# Patient Record
Sex: FEMALE | Race: WHITE | URBAN - METROPOLITAN AREA
[De-identification: names, ages, dates, MRNs, and addresses within clinical notes are randomized per-mention and may not be internally consistent; named-entity substitution may affect disease eponyms.]

---

## 2017-05-16 RX ORDER — LOSARTAN POTASSIUM AND HYDROCHLOROTHIAZIDE 12.5; 5 MG/1; MG/1
1 TABLET ORAL AT BEDTIME
COMMUNITY

## 2017-05-17 ENCOUNTER — ANESTHESIA EVENT (OUTPATIENT)
Dept: SURGERY | Facility: CLINIC | Age: 72
DRG: 470 | End: 2017-05-17
Payer: MEDICARE

## 2017-05-17 RX ORDER — VANCOMYCIN HYDROCHLORIDE 1 G/200ML
1000 INJECTION, SOLUTION INTRAVENOUS SEE ADMIN INSTRUCTIONS
Status: CANCELLED | OUTPATIENT
Start: 2017-05-17

## 2017-05-17 RX ORDER — CLINDAMYCIN PHOSPHATE 900 MG/50ML
900 INJECTION, SOLUTION INTRAVENOUS SEE ADMIN INSTRUCTIONS
Status: CANCELLED | OUTPATIENT
Start: 2017-05-17

## 2017-05-17 RX ORDER — VANCOMYCIN HYDROCHLORIDE 1 G/200ML
1000 INJECTION, SOLUTION INTRAVENOUS
Status: CANCELLED | OUTPATIENT
Start: 2017-05-17

## 2017-05-17 RX ORDER — CLINDAMYCIN PHOSPHATE 900 MG/50ML
900 INJECTION, SOLUTION INTRAVENOUS
Status: CANCELLED | OUTPATIENT
Start: 2017-05-17

## 2017-05-18 ENCOUNTER — ANESTHESIA (OUTPATIENT)
Dept: SURGERY | Facility: CLINIC | Age: 72
DRG: 470 | End: 2017-05-18
Payer: MEDICARE

## 2017-05-18 ENCOUNTER — APPOINTMENT (OUTPATIENT)
Dept: GENERAL RADIOLOGY | Facility: CLINIC | Age: 72
DRG: 470 | End: 2017-05-18
Attending: ORTHOPAEDIC SURGERY
Payer: MEDICARE

## 2017-05-18 ENCOUNTER — HOSPITAL ENCOUNTER (INPATIENT)
Facility: CLINIC | Age: 72
LOS: 3 days | Discharge: HOME | DRG: 470 | End: 2017-05-21
Attending: ORTHOPAEDIC SURGERY | Admitting: ORTHOPAEDIC SURGERY
Payer: MEDICARE

## 2017-05-18 ENCOUNTER — APPOINTMENT (OUTPATIENT)
Dept: PHYSICAL THERAPY | Facility: CLINIC | Age: 72
DRG: 470 | End: 2017-05-18
Attending: ORTHOPAEDIC SURGERY
Payer: MEDICARE

## 2017-05-18 DIAGNOSIS — Z96.652 STATUS POST TOTAL LEFT KNEE REPLACEMENT: Primary | ICD-10-CM

## 2017-05-18 LAB
ABO + RH BLD: NORMAL
ABO + RH BLD: NORMAL
BLD GP AB SCN SERPL QL: NORMAL
BLOOD BANK CMNT PATIENT-IMP: NORMAL
POTASSIUM SERPL-SCNC: 3 MMOL/L (ref 3.4–5.3)
POTASSIUM SERPL-SCNC: 3.6 MMOL/L (ref 3.4–5.3)
SPECIMEN EXP DATE BLD: NORMAL

## 2017-05-18 PROCEDURE — 37000008 ZZH ANESTHESIA TECHNICAL FEE, 1ST 30 MIN: Performed by: ORTHOPAEDIC SURGERY

## 2017-05-18 PROCEDURE — 36415 COLL VENOUS BLD VENIPUNCTURE: CPT | Performed by: ANESTHESIOLOGY

## 2017-05-18 PROCEDURE — 25000132 ZZH RX MED GY IP 250 OP 250 PS 637: Mod: GY | Performed by: ORTHOPAEDIC SURGERY

## 2017-05-18 PROCEDURE — 25000125 ZZHC RX 250: Performed by: ORTHOPAEDIC SURGERY

## 2017-05-18 PROCEDURE — 0SRD0J9 REPLACEMENT OF LEFT KNEE JOINT WITH SYNTHETIC SUBSTITUTE, CEMENTED, OPEN APPROACH: ICD-10-PCS | Performed by: ORTHOPAEDIC SURGERY

## 2017-05-18 PROCEDURE — A9270 NON-COVERED ITEM OR SERVICE: HCPCS | Mod: GY | Performed by: ORTHOPAEDIC SURGERY

## 2017-05-18 PROCEDURE — 25000128 H RX IP 250 OP 636: Performed by: ORTHOPAEDIC SURGERY

## 2017-05-18 PROCEDURE — 25000125 ZZHC RX 250: Performed by: SURGERY

## 2017-05-18 PROCEDURE — 84132 ASSAY OF SERUM POTASSIUM: CPT | Performed by: ORTHOPAEDIC SURGERY

## 2017-05-18 PROCEDURE — 36000093 ZZH SURGERY LEVEL 4 1ST 30 MIN: Performed by: ORTHOPAEDIC SURGERY

## 2017-05-18 PROCEDURE — 25000128 H RX IP 250 OP 636

## 2017-05-18 PROCEDURE — 71000012 ZZH RECOVERY PHASE 1 LEVEL 1 FIRST HR: Performed by: ORTHOPAEDIC SURGERY

## 2017-05-18 PROCEDURE — 27810169 ZZH OR IMPLANT GENERAL: Performed by: ORTHOPAEDIC SURGERY

## 2017-05-18 PROCEDURE — 27110028 ZZH OR GENERAL SUPPLY NON-STERILE: Performed by: ORTHOPAEDIC SURGERY

## 2017-05-18 PROCEDURE — 86900 BLOOD TYPING SEROLOGIC ABO: CPT | Performed by: ORTHOPAEDIC SURGERY

## 2017-05-18 PROCEDURE — 97161 PT EVAL LOW COMPLEX 20 MIN: CPT | Mod: GP | Performed by: PHYSICAL THERAPIST

## 2017-05-18 PROCEDURE — 86850 RBC ANTIBODY SCREEN: CPT | Performed by: ORTHOPAEDIC SURGERY

## 2017-05-18 PROCEDURE — 25000128 H RX IP 250 OP 636: Performed by: SURGERY

## 2017-05-18 PROCEDURE — 25000125 ZZHC RX 250: Performed by: ANESTHESIOLOGY

## 2017-05-18 PROCEDURE — 27210995 ZZH RX 272: Performed by: ORTHOPAEDIC SURGERY

## 2017-05-18 PROCEDURE — 37000009 ZZH ANESTHESIA TECHNICAL FEE, EACH ADDTL 15 MIN: Performed by: ORTHOPAEDIC SURGERY

## 2017-05-18 PROCEDURE — 40000986 XR KNEE PORT LT 1/2 VW: Mod: LT

## 2017-05-18 PROCEDURE — 12000007 ZZH R&B INTERMEDIATE

## 2017-05-18 PROCEDURE — 25000125 ZZHC RX 250: Performed by: NURSE ANESTHETIST, CERTIFIED REGISTERED

## 2017-05-18 PROCEDURE — 25800025 ZZH RX 258: Performed by: ORTHOPAEDIC SURGERY

## 2017-05-18 PROCEDURE — 25000128 H RX IP 250 OP 636: Performed by: ANESTHESIOLOGY

## 2017-05-18 PROCEDURE — 40000193 ZZH STATISTIC PT WARD VISIT: Performed by: PHYSICAL THERAPIST

## 2017-05-18 PROCEDURE — 27210794 ZZH OR GENERAL SUPPLY STERILE: Performed by: ORTHOPAEDIC SURGERY

## 2017-05-18 PROCEDURE — 97530 THERAPEUTIC ACTIVITIES: CPT | Mod: GP | Performed by: PHYSICAL THERAPIST

## 2017-05-18 PROCEDURE — 36415 COLL VENOUS BLD VENIPUNCTURE: CPT | Performed by: ORTHOPAEDIC SURGERY

## 2017-05-18 PROCEDURE — 40000171 ZZH STATISTIC PRE-PROCEDURE ASSESSMENT III: Performed by: ORTHOPAEDIC SURGERY

## 2017-05-18 PROCEDURE — 25000128 H RX IP 250 OP 636: Performed by: NURSE ANESTHETIST, CERTIFIED REGISTERED

## 2017-05-18 PROCEDURE — 86901 BLOOD TYPING SEROLOGIC RH(D): CPT | Performed by: ORTHOPAEDIC SURGERY

## 2017-05-18 PROCEDURE — 84132 ASSAY OF SERUM POTASSIUM: CPT | Performed by: ANESTHESIOLOGY

## 2017-05-18 PROCEDURE — 97110 THERAPEUTIC EXERCISES: CPT | Mod: GP | Performed by: PHYSICAL THERAPIST

## 2017-05-18 PROCEDURE — 97116 GAIT TRAINING THERAPY: CPT | Mod: GP | Performed by: PHYSICAL THERAPIST

## 2017-05-18 PROCEDURE — 36000063 ZZH SURGERY LEVEL 4 EA 15 ADDTL MIN: Performed by: ORTHOPAEDIC SURGERY

## 2017-05-18 PROCEDURE — 82565 ASSAY OF CREATININE: CPT | Performed by: ORTHOPAEDIC SURGERY

## 2017-05-18 PROCEDURE — C1776 JOINT DEVICE (IMPLANTABLE): HCPCS | Performed by: ORTHOPAEDIC SURGERY

## 2017-05-18 DEVICE — IMP COMP TIBIAL KNEE ASCENT 71MM 141223: Type: IMPLANTABLE DEVICE | Site: KNEE | Status: FUNCTIONAL

## 2017-05-18 DEVICE — BONE CEMENT RADIOPAQUE SIMPLEX HV FULL DOSE 6194-1-001: Type: IMPLANTABLE DEVICE | Site: KNEE | Status: FUNCTIONAL

## 2017-05-18 DEVICE — IMP INSERT BASEPLATE TIBIAL BIOM 14X71/75 183644: Type: IMPLANTABLE DEVICE | Site: KNEE | Status: FUNCTIONAL

## 2017-05-18 DEVICE — IMP COMP FEMORAL VANGARD BIOM PS LT 65MM 183128: Type: IMPLANTABLE DEVICE | Site: KNEE | Status: FUNCTIONAL

## 2017-05-18 DEVICE — IMP COMP PATELLA BIOM 3 PEG 34MM STD 184766: Type: IMPLANTABLE DEVICE | Site: KNEE | Status: FUNCTIONAL

## 2017-05-18 RX ORDER — CEFAZOLIN SODIUM 2 G/100ML
2 INJECTION, SOLUTION INTRAVENOUS EVERY 8 HOURS
Status: COMPLETED | OUTPATIENT
Start: 2017-05-18 | End: 2017-05-19

## 2017-05-18 RX ORDER — ONDANSETRON 4 MG/1
4 TABLET, ORALLY DISINTEGRATING ORAL EVERY 6 HOURS PRN
Status: DISCONTINUED | OUTPATIENT
Start: 2017-05-18 | End: 2017-05-21 | Stop reason: HOSPADM

## 2017-05-18 RX ORDER — ATORVASTATIN CALCIUM 20 MG/1
80 TABLET, FILM COATED ORAL AT BEDTIME
Status: DISCONTINUED | OUTPATIENT
Start: 2017-05-18 | End: 2017-05-21 | Stop reason: HOSPADM

## 2017-05-18 RX ORDER — LIDOCAINE 40 MG/G
CREAM TOPICAL
Status: DISCONTINUED | OUTPATIENT
Start: 2017-05-18 | End: 2017-05-21 | Stop reason: HOSPADM

## 2017-05-18 RX ORDER — HYDROMORPHONE HYDROCHLORIDE 1 MG/ML
INJECTION, SOLUTION INTRAMUSCULAR; INTRAVENOUS; SUBCUTANEOUS
Status: COMPLETED
Start: 2017-05-18 | End: 2017-05-18

## 2017-05-18 RX ORDER — HYDROMORPHONE HYDROCHLORIDE 1 MG/ML
.3-.5 INJECTION, SOLUTION INTRAMUSCULAR; INTRAVENOUS; SUBCUTANEOUS EVERY 30 MIN PRN
Status: DISCONTINUED | OUTPATIENT
Start: 2017-05-18 | End: 2017-05-21 | Stop reason: HOSPADM

## 2017-05-18 RX ORDER — LIDOCAINE HYDROCHLORIDE 20 MG/ML
INJECTION, SOLUTION INFILTRATION; PERINEURAL PRN
Status: DISCONTINUED | OUTPATIENT
Start: 2017-05-18 | End: 2017-05-18

## 2017-05-18 RX ORDER — ANASTROZOLE 1 MG/1
1 TABLET ORAL DAILY
Status: DISCONTINUED | OUTPATIENT
Start: 2017-05-18 | End: 2017-05-21 | Stop reason: HOSPADM

## 2017-05-18 RX ORDER — CEFAZOLIN SODIUM 1 G/3ML
1 INJECTION, POWDER, FOR SOLUTION INTRAMUSCULAR; INTRAVENOUS SEE ADMIN INSTRUCTIONS
Status: DISCONTINUED | OUTPATIENT
Start: 2017-05-18 | End: 2017-05-18 | Stop reason: HOSPADM

## 2017-05-18 RX ORDER — LOSARTAN POTASSIUM AND HYDROCHLOROTHIAZIDE 12.5; 5 MG/1; MG/1
1 TABLET ORAL AT BEDTIME
Status: DISCONTINUED | OUTPATIENT
Start: 2017-05-18 | End: 2017-05-21 | Stop reason: HOSPADM

## 2017-05-18 RX ORDER — CEFAZOLIN SODIUM 2 G/100ML
2 INJECTION, SOLUTION INTRAVENOUS
Status: COMPLETED | OUTPATIENT
Start: 2017-05-18 | End: 2017-05-18

## 2017-05-18 RX ORDER — AMOXICILLIN 250 MG
1-2 CAPSULE ORAL 2 TIMES DAILY
Status: DISCONTINUED | OUTPATIENT
Start: 2017-05-18 | End: 2017-05-21 | Stop reason: HOSPADM

## 2017-05-18 RX ORDER — ONDANSETRON 4 MG/1
4 TABLET, ORALLY DISINTEGRATING ORAL EVERY 30 MIN PRN
Status: DISCONTINUED | OUTPATIENT
Start: 2017-05-18 | End: 2017-05-18 | Stop reason: HOSPADM

## 2017-05-18 RX ORDER — DEXTROSE MONOHYDRATE, SODIUM CHLORIDE, AND POTASSIUM CHLORIDE 50; 1.49; 4.5 G/1000ML; G/1000ML; G/1000ML
INJECTION, SOLUTION INTRAVENOUS CONTINUOUS
Status: DISCONTINUED | OUTPATIENT
Start: 2017-05-18 | End: 2017-05-21 | Stop reason: HOSPADM

## 2017-05-18 RX ORDER — CELECOXIB 200 MG/1
200 CAPSULE ORAL DAILY
Status: DISCONTINUED | OUTPATIENT
Start: 2017-05-20 | End: 2017-05-21 | Stop reason: HOSPADM

## 2017-05-18 RX ORDER — ONDANSETRON 2 MG/ML
INJECTION INTRAMUSCULAR; INTRAVENOUS PRN
Status: DISCONTINUED | OUTPATIENT
Start: 2017-05-18 | End: 2017-05-18

## 2017-05-18 RX ORDER — OXYCODONE HYDROCHLORIDE 5 MG/1
5-10 TABLET ORAL
Status: DISCONTINUED | OUTPATIENT
Start: 2017-05-18 | End: 2017-05-21 | Stop reason: HOSPADM

## 2017-05-18 RX ORDER — SODIUM CHLORIDE, SODIUM LACTATE, POTASSIUM CHLORIDE, CALCIUM CHLORIDE 600; 310; 30; 20 MG/100ML; MG/100ML; MG/100ML; MG/100ML
INJECTION, SOLUTION INTRAVENOUS CONTINUOUS
Status: DISCONTINUED | OUTPATIENT
Start: 2017-05-18 | End: 2017-05-18 | Stop reason: HOSPADM

## 2017-05-18 RX ORDER — DIPHENHYDRAMINE HYDROCHLORIDE 50 MG/ML
12.5 INJECTION INTRAMUSCULAR; INTRAVENOUS EVERY 6 HOURS PRN
Status: DISCONTINUED | OUTPATIENT
Start: 2017-05-18 | End: 2017-05-21 | Stop reason: HOSPADM

## 2017-05-18 RX ORDER — POTASSIUM CHLORIDE 1.5 G/1.58G
20-40 POWDER, FOR SOLUTION ORAL
Status: DISCONTINUED | OUTPATIENT
Start: 2017-05-18 | End: 2017-05-21 | Stop reason: HOSPADM

## 2017-05-18 RX ORDER — ALUMINA, MAGNESIA, AND SIMETHICONE 2400; 2400; 240 MG/30ML; MG/30ML; MG/30ML
15-30 SUSPENSION ORAL EVERY 4 HOURS PRN
Status: DISCONTINUED | OUTPATIENT
Start: 2017-05-18 | End: 2017-05-21 | Stop reason: HOSPADM

## 2017-05-18 RX ORDER — METOPROLOL TARTRATE 1 MG/ML
1-2 INJECTION, SOLUTION INTRAVENOUS EVERY 5 MIN PRN
Status: DISCONTINUED | OUTPATIENT
Start: 2017-05-18 | End: 2017-05-18 | Stop reason: HOSPADM

## 2017-05-18 RX ORDER — MAGNESIUM HYDROXIDE 1200 MG/15ML
LIQUID ORAL PRN
Status: DISCONTINUED | OUTPATIENT
Start: 2017-05-18 | End: 2017-05-18 | Stop reason: HOSPADM

## 2017-05-18 RX ORDER — FENTANYL CITRATE 50 UG/ML
25-50 INJECTION, SOLUTION INTRAMUSCULAR; INTRAVENOUS
Status: DISCONTINUED | OUTPATIENT
Start: 2017-05-18 | End: 2017-05-18 | Stop reason: HOSPADM

## 2017-05-18 RX ORDER — ONDANSETRON 2 MG/ML
4 INJECTION INTRAMUSCULAR; INTRAVENOUS EVERY 30 MIN PRN
Status: DISCONTINUED | OUTPATIENT
Start: 2017-05-18 | End: 2017-05-18 | Stop reason: HOSPADM

## 2017-05-18 RX ORDER — POTASSIUM CHLORIDE 1500 MG/1
20-40 TABLET, EXTENDED RELEASE ORAL
Status: DISCONTINUED | OUTPATIENT
Start: 2017-05-18 | End: 2017-05-21 | Stop reason: HOSPADM

## 2017-05-18 RX ORDER — SODIUM CHLORIDE, SODIUM LACTATE, POTASSIUM CHLORIDE, CALCIUM CHLORIDE 600; 310; 30; 20 MG/100ML; MG/100ML; MG/100ML; MG/100ML
INJECTION, SOLUTION INTRAVENOUS CONTINUOUS PRN
Status: DISCONTINUED | OUTPATIENT
Start: 2017-05-18 | End: 2017-05-18

## 2017-05-18 RX ORDER — POTASSIUM CHLORIDE 7.45 MG/ML
10 INJECTION INTRAVENOUS
Status: DISCONTINUED | OUTPATIENT
Start: 2017-05-18 | End: 2017-05-21 | Stop reason: HOSPADM

## 2017-05-18 RX ORDER — PROPOFOL 10 MG/ML
INJECTION, EMULSION INTRAVENOUS CONTINUOUS PRN
Status: DISCONTINUED | OUTPATIENT
Start: 2017-05-18 | End: 2017-05-18

## 2017-05-18 RX ORDER — MEPERIDINE HYDROCHLORIDE 25 MG/ML
12.5 INJECTION INTRAMUSCULAR; INTRAVENOUS; SUBCUTANEOUS EVERY 5 MIN PRN
Status: DISCONTINUED | OUTPATIENT
Start: 2017-05-18 | End: 2017-05-18 | Stop reason: HOSPADM

## 2017-05-18 RX ORDER — CELECOXIB 200 MG/1
200 CAPSULE ORAL 2 TIMES DAILY
Status: COMPLETED | OUTPATIENT
Start: 2017-05-18 | End: 2017-05-20

## 2017-05-18 RX ORDER — DIPHENHYDRAMINE HCL 12.5MG/5ML
12.5 LIQUID (ML) ORAL EVERY 6 HOURS PRN
Status: DISCONTINUED | OUTPATIENT
Start: 2017-05-18 | End: 2017-05-21 | Stop reason: HOSPADM

## 2017-05-18 RX ORDER — ONDANSETRON 2 MG/ML
4 INJECTION INTRAMUSCULAR; INTRAVENOUS EVERY 6 HOURS PRN
Status: DISCONTINUED | OUTPATIENT
Start: 2017-05-18 | End: 2017-05-21 | Stop reason: HOSPADM

## 2017-05-18 RX ORDER — OXYCODONE HCL 10 MG/1
10 TABLET, FILM COATED, EXTENDED RELEASE ORAL EVERY 12 HOURS
Status: DISCONTINUED | OUTPATIENT
Start: 2017-05-18 | End: 2017-05-21 | Stop reason: HOSPADM

## 2017-05-18 RX ORDER — BUPIVACAINE HYDROCHLORIDE 7.5 MG/ML
INJECTION, SOLUTION INTRASPINAL PRN
Status: DISCONTINUED | OUTPATIENT
Start: 2017-05-18 | End: 2017-05-18

## 2017-05-18 RX ORDER — POTASSIUM CL/LIDO/0.9 % NACL 10MEQ/0.1L
10 INTRAVENOUS SOLUTION, PIGGYBACK (ML) INTRAVENOUS
Status: DISCONTINUED | OUTPATIENT
Start: 2017-05-18 | End: 2017-05-21 | Stop reason: HOSPADM

## 2017-05-18 RX ORDER — NALOXONE HYDROCHLORIDE 0.4 MG/ML
.1-.4 INJECTION, SOLUTION INTRAMUSCULAR; INTRAVENOUS; SUBCUTANEOUS
Status: DISCONTINUED | OUTPATIENT
Start: 2017-05-18 | End: 2017-05-21 | Stop reason: HOSPADM

## 2017-05-18 RX ORDER — HYDROMORPHONE HYDROCHLORIDE 1 MG/ML
.3-.5 INJECTION, SOLUTION INTRAMUSCULAR; INTRAVENOUS; SUBCUTANEOUS EVERY 5 MIN PRN
Status: DISCONTINUED | OUTPATIENT
Start: 2017-05-18 | End: 2017-05-18 | Stop reason: HOSPADM

## 2017-05-18 RX ORDER — ACETAMINOPHEN 325 MG/1
650 TABLET ORAL EVERY 4 HOURS PRN
Status: DISCONTINUED | OUTPATIENT
Start: 2017-05-21 | End: 2017-05-21 | Stop reason: HOSPADM

## 2017-05-18 RX ORDER — FENTANYL CITRATE 50 UG/ML
25-50 INJECTION, SOLUTION INTRAMUSCULAR; INTRAVENOUS
Status: COMPLETED | OUTPATIENT
Start: 2017-05-18 | End: 2017-05-18

## 2017-05-18 RX ORDER — ACETAMINOPHEN 325 MG/1
975 TABLET ORAL EVERY 8 HOURS
Status: DISPENSED | OUTPATIENT
Start: 2017-05-18 | End: 2017-05-21

## 2017-05-18 RX ORDER — POTASSIUM CHLORIDE 29.8 MG/ML
20 INJECTION INTRAVENOUS
Status: DISCONTINUED | OUTPATIENT
Start: 2017-05-18 | End: 2017-05-21 | Stop reason: HOSPADM

## 2017-05-18 RX ORDER — CEFAZOLIN SODIUM 1 G/3ML
INJECTION, POWDER, FOR SOLUTION INTRAMUSCULAR; INTRAVENOUS PRN
Status: DISCONTINUED | OUTPATIENT
Start: 2017-05-18 | End: 2017-05-18

## 2017-05-18 RX ORDER — PROCHLORPERAZINE MALEATE 5 MG
5 TABLET ORAL EVERY 6 HOURS PRN
Status: DISCONTINUED | OUTPATIENT
Start: 2017-05-18 | End: 2017-05-21 | Stop reason: HOSPADM

## 2017-05-18 RX ORDER — GABAPENTIN 100 MG/1
100 CAPSULE ORAL 3 TIMES DAILY
Status: COMPLETED | OUTPATIENT
Start: 2017-05-18 | End: 2017-05-21

## 2017-05-18 RX ADMIN — OXYCODONE HYDROCHLORIDE 10 MG: 10 TABLET, FILM COATED, EXTENDED RELEASE ORAL at 17:55

## 2017-05-18 RX ADMIN — HYDROMORPHONE HYDROCHLORIDE 0.5 MG: 1 INJECTION, SOLUTION INTRAMUSCULAR; INTRAVENOUS; SUBCUTANEOUS at 16:58

## 2017-05-18 RX ADMIN — OXYCODONE HYDROCHLORIDE 5 MG: 5 TABLET ORAL at 22:30

## 2017-05-18 RX ADMIN — CELECOXIB 200 MG: 200 CAPSULE ORAL at 20:05

## 2017-05-18 RX ADMIN — LOSARTAN POTASSIUM AND HYDROCHLOROTHIAZIDE 1 TABLET: 50; 12.5 TABLET, FILM COATED ORAL at 22:32

## 2017-05-18 RX ADMIN — SODIUM CHLORIDE, POTASSIUM CHLORIDE, SODIUM LACTATE AND CALCIUM CHLORIDE: 600; 310; 30; 20 INJECTION, SOLUTION INTRAVENOUS at 11:16

## 2017-05-18 RX ADMIN — GABAPENTIN 100 MG: 100 CAPSULE ORAL at 17:55

## 2017-05-18 RX ADMIN — HYDROMORPHONE HYDROCHLORIDE 0.5 MG: 1 INJECTION, SOLUTION INTRAMUSCULAR; INTRAVENOUS; SUBCUTANEOUS at 14:30

## 2017-05-18 RX ADMIN — SODIUM CHLORIDE, POTASSIUM CHLORIDE, SODIUM LACTATE AND CALCIUM CHLORIDE: 600; 310; 30; 20 INJECTION, SOLUTION INTRAVENOUS at 09:43

## 2017-05-18 RX ADMIN — SODIUM CHLORIDE, POTASSIUM CHLORIDE, SODIUM LACTATE AND CALCIUM CHLORIDE: 600; 310; 30; 20 INJECTION, SOLUTION INTRAVENOUS at 10:24

## 2017-05-18 RX ADMIN — CEFAZOLIN SODIUM 2 G: 2 INJECTION, SOLUTION INTRAVENOUS at 10:31

## 2017-05-18 RX ADMIN — BUPIVACAINE HYDROCHLORIDE IN DEXTROSE 1.8 ML: 7.5 INJECTION, SOLUTION SUBARACHNOID at 10:34

## 2017-05-18 RX ADMIN — CEFAZOLIN 1 G: 1 INJECTION, POWDER, FOR SOLUTION INTRAMUSCULAR; INTRAVENOUS at 12:34

## 2017-05-18 RX ADMIN — LIDOCAINE HYDROCHLORIDE 1 ML: 10 INJECTION, SOLUTION EPIDURAL; INFILTRATION; INTRACAUDAL; PERINEURAL at 09:36

## 2017-05-18 RX ADMIN — OXYCODONE HYDROCHLORIDE 5 MG: 5 TABLET ORAL at 17:55

## 2017-05-18 RX ADMIN — TRANEXAMIC ACID 1 G: 100 INJECTION, SOLUTION INTRAVENOUS at 12:32

## 2017-05-18 RX ADMIN — SODIUM CHLORIDE, POTASSIUM CHLORIDE, SODIUM LACTATE AND CALCIUM CHLORIDE: 600; 310; 30; 20 INJECTION, SOLUTION INTRAVENOUS at 12:41

## 2017-05-18 RX ADMIN — ROPIVACAINE HYDROCHLORIDE 20 ML GIVEN: 5 INJECTION, SOLUTION EPIDURAL; INFILTRATION; PERINEURAL at 10:10

## 2017-05-18 RX ADMIN — MIDAZOLAM HYDROCHLORIDE 1 MG: 1 INJECTION, SOLUTION INTRAMUSCULAR; INTRAVENOUS at 10:31

## 2017-05-18 RX ADMIN — ATORVASTATIN CALCIUM 80 MG: 20 TABLET, FILM COATED ORAL at 22:29

## 2017-05-18 RX ADMIN — LIDOCAINE HYDROCHLORIDE 40 MG: 20 INJECTION, SOLUTION INFILTRATION; PERINEURAL at 10:37

## 2017-05-18 RX ADMIN — PROPOFOL 40 MCG/KG/MIN: 10 INJECTION, EMULSION INTRAVENOUS at 10:35

## 2017-05-18 RX ADMIN — FENTANYL CITRATE 50 MCG: 50 INJECTION, SOLUTION INTRAMUSCULAR; INTRAVENOUS at 10:07

## 2017-05-18 RX ADMIN — SENNOSIDES AND DOCUSATE SODIUM 2 TABLET: 8.6; 5 TABLET ORAL at 20:05

## 2017-05-18 RX ADMIN — TRANEXAMIC ACID 1 G: 100 INJECTION, SOLUTION INTRAVENOUS at 10:40

## 2017-05-18 RX ADMIN — CEFAZOLIN SODIUM 2 G: 2 INJECTION, SOLUTION INTRAVENOUS at 20:01

## 2017-05-18 RX ADMIN — ACETAMINOPHEN 975 MG: 325 TABLET, FILM COATED ORAL at 17:54

## 2017-05-18 RX ADMIN — MIDAZOLAM HYDROCHLORIDE 1 MG: 1 INJECTION, SOLUTION INTRAMUSCULAR; INTRAVENOUS at 10:07

## 2017-05-18 RX ADMIN — HYDROMORPHONE HYDROCHLORIDE 0.5 MG: 1 INJECTION, SOLUTION INTRAMUSCULAR; INTRAVENOUS; SUBCUTANEOUS at 13:43

## 2017-05-18 RX ADMIN — POTASSIUM CHLORIDE, DEXTROSE MONOHYDRATE AND SODIUM CHLORIDE: 150; 5; 450 INJECTION, SOLUTION INTRAVENOUS at 18:00

## 2017-05-18 RX ADMIN — ONDANSETRON 4 MG: 2 INJECTION INTRAMUSCULAR; INTRAVENOUS at 12:37

## 2017-05-18 RX ADMIN — MIDAZOLAM HYDROCHLORIDE 1 MG: 1 INJECTION, SOLUTION INTRAMUSCULAR; INTRAVENOUS at 10:28

## 2017-05-18 RX ADMIN — GABAPENTIN 100 MG: 100 CAPSULE ORAL at 22:31

## 2017-05-18 NOTE — PROGRESS NOTES
Admission medication history interview status for the 5/18/2017  admission is complete. See EPIC admission navigator for prior to admission medications     Medication history source reliability:Good    Medication history interview source(s):Patient    Medication history resources (including written lists, pill bottles, clinic record):Patient mailed in her medication list prior to surgery    Primary pharmacy.[facility]    Additional medication history information not noted on PTA med list :None    Time spent in this activity: 40 minutes    Prior to Admission medications    Medication Sig Last Dose Taking? Auth Provider   ANASTROZOLE PO Take 1 mg by mouth daily 5/17/2017 at AM Yes Reported, Patient   VITAMIN D, CHOLECALCIFEROL, PO Take 1,000 Units by mouth daily 5/17/2017 at AM Yes Reported, Patient   Atorvastatin Calcium (LIPITOR PO) Take 80 mg by mouth At Bedtime 5/17/2017 at 2130 Yes Reported, Patient   losartan-hydrochlorothiazide (HYZAAR) 50-12.5 MG per tablet Take 1 tablet by mouth At Bedtime 5/17/2017 at 2130 Yes Reported, Patient

## 2017-05-18 NOTE — IP AVS SNAPSHOT
MRN:[id number]                      After Visit Summary   5/18/2017    Viji Sommer    MRN: [id number]           Thank you!     Thank you for choosing [facility] for your care. Our goal is always to provide you with excellent care. Hearing back from our patients is one way we can continue to improve our services. Please take a few minutes to complete the written survey that you may receive in the mail after you visit with us. Thank you!        Patient Information     Date Of Birth          1945        Designated Caregiver       Most Recent Value    Caregiver    Will someone help with your care after discharge? yes    Name of designated caregiver Jef    Phone number of caregiver [phone]    Caregiver address [address]      About your hospital stay     You were admitted on:  May 18, 2017 You last received care in the:  [place] [address] [facility]    You were discharged on:  May 21, 2017       Who to Call     For medical emergencies, please call 911.  For non-urgent questions about your medical care, please call your primary care provider or clinic, [phone]  For questions related to your surgery, please call your surgery clinic        Attending Provider     Provider Specialty    Igor Mas MD Orthopedics       Primary Care Provider Office Phone # Fax #    Jazmin SANGEETHA Aj [phone]       [place] [address]        After Care Instructions     Activity       Your activity upon discharge: activity as tolerated            Discharge Instructions       Knee immobilizer qhs x 7 days after discharge  Maxi hose until seen in follow up. May be off at night  May shower and let water roll over the wound. Pat dry with a towel            Wound care and dressings       Instructions to care for your wound at home: daily dressing changes until wound dry then leave open to air.                 "  Follow-up Appointments     Follow-up and recommended labs and tests        F/U Dr. Mas 2 weeks post op  F/U outpatient PT asap                  Further instructions from your care team       TOTAL KNEE REPLACEMENT TAKE HOME INSTRUCTIONS  Your surgeon will answer any questions about your progress. General guidelines for your care are listed below. Your surgeon may give additional instructions for your care at home. Please follow them carefully    Activity Level  1. Physical activity may be resumed gradually according to your comfort level and your surgeon s instructions. Follow your exercise program as instructed by your therapist. Do exercises at least twice a day. you may ice your knee after exercising.  2. Complete exercises two hours before bedtime to minimize the effect pain may have on sleep.  Refer to pages 19-22 of you \"Total Knee Replacement\" booklet for details  3. Do not wear your knee immobilizer unless your doctor has specifically told you to continue it.    Good Health Practices  1. Maintain an adequate fluid intake and eat a well balanced diet.  2. Be sure to include the basic food groups, such as dairy products, meat/fish, vegetables, and fruit. Each of these foods contribute to your wound healing and increasing your strength.  3. Surgery, decreased activity and pain medication all contribute to a decrease in bowel activity that can result in constipation. It is recommended that you increase your liquid intake, add fiber to your diet, increase activity, and decrease pain medication use. If you have any problems, notify your physician.  4. Wear your anti-embolism stockings day and night until seen by your surgeon if you were issued these at discharge.  (Not all patients will have anti-embolism stockings) Remove twice a day for one hour at a time. You may hand wash and air dry your stockings.  5. Notify your dentist of your total knee surgery and call your dentist one week before a dental appointment " "for antibiotics, if your dentist will not prescribe antibiotics then call your surgeon to ask for next steps.      Incision/Dressing Care  1. Keep incision clean and dry per surgeons instructions  2. Cover incision if you are still having drainage.  3.  May Shower.    Things to Watch For  1. Check incision daily for increased redness, tenderness, swelling, or drainage along the incision line. If these occur, please notify your doctor. Also, call if you develop a fever above 101 .  2. Please notify your doctor if you experience any calf pain and/or if you have surgical pain not relieved by the pain medication prescribed by your doctor.        Revised 05/8/17    Pending Results     No orders found from 5/16/2017 to 5/19/2017.            Statement of Approval     Ordered          05/20/17 1038  I have reviewed and agree with all the recommendations and orders detailed in this document.  EFFECTIVE NOW     Approved and electronically signed by:  Igor Mas MD             Admission Information     Date & Time Provider Department Dept. Phone    5/18/2017 Igor Mas MD [place] [address] [facility] [phone]      Your Vitals Were     Blood Pressure Pulse Temperature Respirations Height Weight    109/54 (BP Location: Right arm) 65 98.3  F (36.8  C) (Oral) 16 1.702 m (5' 7\") 86.6 kg (191 lb)    Pulse Oximetry BMI (Body Mass Index)                95% 29.91 kg/m2          MyChart Information     [employer] lets you send messages to your doctor, view your test results, renew your prescriptions, schedule appointments and more. To sign up, go to [email or web address] . Click on \"Log in\" on the left side of the screen, which will take you to the Welcome page. Then click on \"Sign up Now\" on the right side of the page.     You will be asked to enter the access code listed below, as well as some personal information. Please follow the directions to create your username and password.     Your access code is: " [email or web address]  Expires: 2017 10:25 AM     Your access code will  in 90 days. If you need help or a new code, please call your [facility] clinic or [phone].        Care EveryWhere ID     This is your Care EveryWhere ID. This could be used by other organizations to access your [facility] medical records  [id number]           Review of your medicines      START taking        Dose / Directions    aspirin 325 MG tablet        Dose:  325 mg   Take 1 tablet (325 mg) by mouth 2 times daily   Quantity:  90 tablet   Refills:  0       celecoxib 200 MG capsule   Commonly known as:  celeBREX        Dose:  200 mg   Take 1 capsule (200 mg) by mouth daily   Quantity:  30 capsule   Refills:  0       melatonin 3 MG tablet        Dose:  3 mg   Take 1 tablet (3 mg) by mouth nightly as needed for sleep   Quantity:  60 tablet   Refills:  0       * oxyCODONE 10 MG 12 hr tablet   Commonly known as:  OXYCONTIN   Notes to Patient:  PT REFUSED DUE TO NAUSEA, WILL GIVE HARD COPY        Dose:  10 mg   Take 1 tablet (10 mg) by mouth every 12 hours   Quantity:  10 tablet   Refills:  0       * oxyCODONE 5 MG IR tablet   Commonly known as:  ROXICODONE        Dose:  5-10 mg   Take 1-2 tablets (5-10 mg) by mouth every 3 hours as needed for moderate to severe pain   Quantity:  60 tablet   Refills:  0       senna-docusate 8.6-50 MG per tablet   Commonly known as:  SENOKOT-S;PERICOLACE        Dose:  1-2 tablet   Take 1-2 tablets by mouth 2 times daily as needed for constipation   Quantity:  100 tablet   Refills:  0       * Notice:  This list has 2 medication(s) that are the same as other medications prescribed for you. Read the directions carefully, and ask your doctor or other care provider to review them with you.      CONTINUE these medicines which have NOT CHANGED        Dose / Directions    ANASTROZOLE PO        Dose:  1 mg   Take 1 mg by mouth daily   Refills:  0       LIPITOR PO        Dose:  80 mg   Take 80 mg by mouth At Bedtime    Refills:  0       losartan-hydrochlorothiazide 50-12.5 MG per tablet   Commonly known as:  HYZAAR        Dose:  1 tablet   Take 1 tablet by mouth At Bedtime   Refills:  0       VITAMIN D (CHOLECALCIFEROL) PO        Dose:  1000 Units   Take 1,000 Units by mouth daily   Refills:  0            Where to get your medicines      These medications were sent to [facility] [place], [place] - [address]  [address], [place] [address]     Phone:  [phone]     aspirin 325 MG tablet    celecoxib 200 MG capsule    melatonin 3 MG tablet    senna-docusate 8.6-50 MG per tablet         Some of these will need a paper prescription and others can be bought over the counter. Ask your nurse if you have questions.     Bring a paper prescription for each of these medications     oxyCODONE 10 MG 12 hr tablet    oxyCODONE 5 MG IR tablet                Protect others around you: Learn how to safely use, store and throw away your medicines at www.disposemymeds.org.             Medication List: This is a list of all your medications and when to take them. Check marks below indicate your daily home schedule. Keep this list as a reference.      Medications           Morning Afternoon Evening Bedtime As Needed    ANASTROZOLE PO   Take 1 mg by mouth daily   Last time this was given:  1 mg on 5/21/2017  9:11 AM   Next Dose Due:  5/22                                   aspirin 325 MG tablet   Take 1 tablet (325 mg) by mouth 2 times daily   Next Dose Due:  5/22                                       celecoxib 200 MG capsule   Commonly known as:  celeBREX   Take 1 capsule (200 mg) by mouth daily   Last time this was given:  200 mg on 5/21/2017  9:11 AM   Next Dose Due:  5/22                                   LIPITOR PO   Take 80 mg by mouth At Bedtime   Last time this was given:  80 mg on 5/20/2017 10:34 PM   Next Dose Due:  5/21                                   losartan-hydrochlorothiazide 50-12.5 MG per tablet    Commonly known as:  HYZAAR   Take 1 tablet by mouth At Bedtime   Last time this was given:  1 tablet on 5/19/2017  9:46 PM   Next Dose Due:  5/21                                   melatonin 3 MG tablet   Take 1 tablet (3 mg) by mouth nightly as needed for sleep                                   * oxyCODONE 10 MG 12 hr tablet   Commonly known as:  OXYCONTIN   Take 1 tablet (10 mg) by mouth every 12 hours   Last time this was given:  10 mg on 5/20/2017  5:53 PM   Notes to Patient:  PT REFUSED DUE TO NAUSEA, WILL GIVE HARD COPY                                * oxyCODONE 5 MG IR tablet   Commonly known as:  ROXICODONE   Take 1-2 tablets (5-10 mg) by mouth every 3 hours as needed for moderate to severe pain   Last time this was given:  5 mg on 5/21/2017  2:26 PM                    5 mg if needed 5:30 pm                 senna-docusate 8.6-50 MG per tablet   Commonly known as:  SENOKOT-S;PERICOLACE   Take 1-2 tablets by mouth 2 times daily as needed for constipation   Last time this was given:  1 tablet on 5/21/2017  9:11 AM   Next Dose Due:  5/21                                      VITAMIN D (CHOLECALCIFEROL) PO   Take 1,000 Units by mouth daily   Last time this was given:  1,000 Units on 5/21/2017  9:11 AM   Next Dose Due:  5/22                                   * Notice:  This list has 2 medication(s) that are the same as other medications prescribed for you. Read the directions carefully, and ask your doctor or other care provider to review them with you.

## 2017-05-18 NOTE — OP NOTE
DATE OF PROCEDURE: 05/18/2017       PREOPERATIVE DIAGNOSIS:  Left knee degenerative arthrosis.      POSTOPERATIVE DIAGNOSIS:  Left knee degenerative arthrosis.      PROCEDURE:  Left total knee arthroplasty.      ASSISTANT:  DONTAE Massey      ANESTHESIA:  Regional.      COMPLICATIONS:  None.      OPERATIVE COURSE:  Ms. Viji Sommer was brought to the operating room.  An anesthetic was administered.  She received prophylactic antibiotics.  Her left lower extremity was prepped and draped in the usual sterile fashion and timeout was called.  She will be on antibiotics for 24 hours postop.  She will be on Lovenox for DVT prophylaxis, switched over to aspirin.        Her left lower extremity was prepped and draped in the usual sterile fashion.  A timeout was called.  The limb was exsanguinated and the tourniquet inflated.  I made a sharp incision from the tibial tubercle to just superior to the superior pole of the patella.  Upon entering the joint, there were large marginal osteophytes in the medial compartment with bone-on-bone contact and eburnated bone.  Cut the patella from a 21 to a 14, prepared it for a 34 button.  I placed my intramedullary guide into the femur.  We used a 6 degree valgus bushing.  He took an additional 1 mm distal femoral resection.  Sized it for a 65, made my 5-in-1 cuts, and 65 provisional fit well.  I then placed the  guide.  Looking at it, I realized it was not the correct one.  I discussed this with the rep.  It appears to be from another set or perhaps revision set.  He had to go get another one and flash it.      During this time, I went ahead and prepared the tibia.  Set the tibial extramedullary cutting guide in the long axis of the tibia.  Used the feeler gauge to take 2 mm off the more deficient medial side.  I elected to take one more.  As I put the #1 feeler gauge in place, it did not appear to be normal.  It looked like it was taking more than 1 minute, and it  frankly would not fit in the guide.  Again, the rep had the wrong instrumentation.  I therefore went back and found the correct cutting guide, which was the Elite System, and it was a 2.  I took an additional 2.  Measured the tibial side for 71.  Cemented everything in place with a 14.  A 16 would have come into full extension, was quite tight; a 12 was too loose.  No lateral release was needed.  During the cementation process, I did copiously irrigate and then soak for a total of 3 minutes with a dilute Betadine solution.  I did eventually get the correct box cutting guide for the [employer] Accessguard PS system.  I did make the box cut prior to cementing and course.  Ended up choosing a 14 as my final spacer.  No lateral release was needed.  Placed my final spacer, irrigated with antibiotics.  brought through range of motion.  Closed over a drain.  Ethibond in the proximal extensor mechanism, 0 Vicryl distally,  2-0 in the subq, staples in the skin.  Bulky sterile dressing was applied.        She returned to the recovery room in stable condition.  All sponge and needle counts were correct at the end of the procedure.  There were no known complications.         YVETTE KIRK MD             D: 2017 13:00   T: 2017 16:11   MT: [id number]      Name:     LAURA MCNEIL   MRN:      [id number]        Account:        [id number]   :      1945           Procedure Date: 2017      Document: [id number]

## 2017-05-18 NOTE — ANESTHESIA PROCEDURE NOTES
Peripheral nerve/Neuraxial procedure note : Femoral (via adductor canal approach)  Pre-Procedure  Performed by RASHID FLORES  Location: pre-op      Pre-Anesthestic Checklist: patient identified, IV checked, site marked, risks and benefits discussed, informed consent, monitors and equipment checked, pre-op evaluation, at physician/surgeon's request and post-op pain management    Timeout  Correct Patient: Yes   Correct Procedure: Yes   Correct Site: Yes   Correct Laterality: Yes   Correct Position: Yes   Site Marked: Yes   .   Procedure Documentation    .    Procedure:    Femoral (via adductor canal approach).  Local skin infiltrated with 3 mL of 1% lidocaine.     Ultrasound used to identify targeted nerve, plexus, or vascular marker and placed a needle adjacent to it., Ultrasound was used to visualize the spread of the anesthetic in close proximity to the above stated nerve. A permanent image is entered into the patient's record.  Patient Prep;mask, sterile gloves, chlorhexidine gluconate and isopropyl alcohol, patient draped.  .  Needle: insulated (21 G. 3.5 in). .  Spinal Needle: . . Insertion Method: Single Shot.     Assessment/Narrative  Paresthesias: No.  Injection made incrementally with aspirations every 5 mL..  The placement was negative for: blood aspirated, painful injection and site bleeding.  Bolus given via needle..   Secured via.   Complications: none. Comments:  Medication: 20cc of 0.5% ropivacaine with 1:400k epinephrine  Brooks-dissection with saline, 5cc  Dose given via 5cc increments with negative aspiration

## 2017-05-18 NOTE — ANESTHESIA CARE TRANSFER NOTE
Patient: Viji Sommer    Procedure(s):  LEFT TOTAL KNEE ARTHROPLASTY - Wound Class: I-Clean    Diagnosis: LEFT KNEE DJD  Diagnosis Additional Information: No value filed.    Anesthesia Type:   Spinal, Periph. Nerve Block for postop pain     Note:  Airway :Room Air  Patient transferred to:PACU  Comments: Transferred to PACU, spontaneous respirations. RA to PACU, connected to wall O2 in PACU. All monitors and alarms on and functioning, clinically stable vital signs. Report given to PACU RN and questions answered.       Vitals: (Last set prior to Anesthesia Care Transfer)    CRNA VITALS  5/18/2017 1223 - 5/18/2017 1259      5/18/2017             Resp Rate (set): 10                Electronically Signed By: LIBERTY Merritt CRNA  May 18, 2017  12:59 PM

## 2017-05-18 NOTE — BRIEF OP NOTE
[facility] Brief Operative Note    Pre-operative diagnosis: LEFT KNEE DJD   Post-operative diagnosis * No post-op diagnosis entered *  same   Procedure: Procedure(s):  LEFT TOTAL KNEE ARTHROPLASTY - Wound Class: I-Clean   Surgeon(s): Surgeon(s) and Role:     * Igor Mas MD - Primary     * Eleni James PA-C - Assisting   Estimated blood loss: 25 mL    Specimens: * No specimens in log *   Findings: Medial oa

## 2017-05-18 NOTE — ANESTHESIA PREPROCEDURE EVALUATION
Procedure: Procedure(s):  ARTHROPLASTY KNEE  Preop diagnosis: LEFT KNEE DJD    Allergies   Allergen Reactions     Lisinopril Cough     Past Medical History:   Diagnosis Date     Arthritis     knees     DCIS (ductal carcinoma in situ)      Hyperlipidemia      Hypertension      Obesity, unspecified      Past Surgical History:   Procedure Laterality Date     BREAST SURGERY      right lumpectomy 2016     EYE SURGERY      Lasik  ,  right eye scar tissue removed 2016     GYN SURGERY      tubal ligation     ORTHOPEDIC SURGERY      right knee arthroscopy 2005, left knee arthroscopic 2014     Prior to Admission medications    Medication Sig Start Date End Date Taking? Authorizing Provider   ANASTROZOLE PO Take 1 mg by mouth daily   Yes Reported, Patient   VITAMIN D, CHOLECALCIFEROL, PO Take 1,000 Units by mouth daily   Yes Reported, Patient   Atorvastatin Calcium (LIPITOR PO) Take 80 mg by mouth At Bedtime   Yes Reported, Patient   losartan-hydrochlorothiazide (HYZAAR) 50-12.5 MG per tablet Take 1 tablet by mouth At Bedtime   Yes Reported, Patient     Current Facility-Administered Medications Ordered in Epic   Medication Dose Route Frequency Last Rate Last Dose     ceFAZolin sodium-dextrose (ANCEF) infusion 2 g  2 g Intravenous Pre-Op/Pre-procedure x 1 dose         ceFAZolin (ANCEF) 1 g vial to attach to  ml bag for ADULT or 50 ml bag for PEDS  1 g Intravenous See Admin Instructions         tranexamic acid (CYKLOKAPRON) 1 g in NaCl 0.9 % 60 mL bolus  1 g Intravenous Once         tranexamic acid (CYKLOKAPRON) 1 g in NaCl 0.9 % 60 mL bolus  1 g Intravenous Once         lidocaine 1 % 1 mL  1 mL Other Q1H PRN         sodium chloride (PF) 0.9% PF flush 3 mL  3 mL Intracatheter Q1H PRN         lactated ringers infusion   Intravenous Continuous         No current [facility]-ordered outpatient prescriptions on file.     Wt Readings from Last 1 Encounters:   05/18/17 86.6 kg (191 lb)     Temp Readings from Last 1 Encounters:    05/18/17 36.4  C (97.5  F) (Temporal)     BP Readings from Last 6 Encounters:   05/18/17 129/73     Pulse Readings from Last 4 Encounters:   05/18/17 54     Resp Readings from Last 1 Encounters:   05/18/17 16     SpO2 Readings from Last 1 Encounters:   05/18/17 99%     No results for input(s): NA, POTASSIUM, CHLORIDE, CO2, ANIONGAP, GLC, BUN, CR, JESSICA in the last 20275 hours.  No results for input(s): WBC, HGB, PLT in the last 43688 hours.  No results for input(s): INR in the last 81122 hours.    Invalid input(s): APTT   RECENT LABS:   ECG:   ECHO:   CXR:      Anesthesia Evaluation     .             ROS/MED HX    ENT/Pulmonary:  - neg pulmonary ROS     Neurologic:  - neg neurologic ROS     Cardiovascular:     (+) Dyslipidemia, hypertension----. : . . . :. .       METS/Exercise Tolerance:  >4 METS   Hematologic:         Musculoskeletal:   (+) arthritis, , , other musculoskeletal- previous knee arthroscopy bilaterally      GI/Hepatic:  - neg GI/hepatic ROS       Renal/Genitourinary:  - ROS Renal section negative       Endo:     (+) Obesity, .      Psychiatric:  - neg psychiatric ROS       Infectious Disease:         Malignancy:   (+) Malignancy History of Breast  Breast CA status post Surgery.         Other:                     Physical Exam  Normal systems: dental    Airway   Mallampati: II  TM distance: >3 FB  Neck ROM: full    Dental     Cardiovascular   Rhythm and rate: regular and normal      Pulmonary    breath sounds clear to auscultation                    Anesthesia Plan      History & Physical Review  History and physical reviewed and following examination; no interval change.    ASA Status:  2 .        Plan for Spinal and Periph. Nerve Block for postop pain   PONV prophylaxis:  Ondansetron (or other 5HT-3) and Dexamethasone or Solumedrol       Postoperative Care  Postoperative pain management:  IV analgesics, Peripheral nerve block (Single Shot) and Multi-modal analgesia.      Consents  Anesthetic plan,  risks, benefits and alternatives discussed with:  Patient..                          .

## 2017-05-18 NOTE — IP AVS SNAPSHOT
[address] [facility]    [address] [place] [address]    Phone:  [phone]                                       After Visit Summary   5/18/2017    Viji Sommer    MRN: [id number]           After Visit Summary Signature Page     I have received my discharge instructions, and my questions have been answered. I have discussed any challenges I see with this plan with the nurse or doctor.    ..........................................................................................................................................  Patient/Patient Representative Signature      ..........................................................................................................................................  Patient Representative Print Name and Relationship to Patient    ..................................................               ................................................  Date                                            Time    ..........................................................................................................................................  Reviewed by Signature/Title    ...................................................              ..............................................  Date                                                            Time

## 2017-05-18 NOTE — ANESTHESIA POSTPROCEDURE EVALUATION
Patient: Viji Sommer    Procedure(s):  LEFT TOTAL KNEE ARTHROPLASTY - Wound Class: I-Clean    Diagnosis:LEFT KNEE DJD  Diagnosis Additional Information: No value filed.    Anesthesia Type:  Spinal, Periph. Nerve Block for postop pain    Note:  Anesthesia Post Evaluation    Patient location during evaluation: PACU  Patient participation: Able to fully participate in evaluation  Level of consciousness: awake and alert  Pain management: adequate  Airway patency: patent  Cardiovascular status: acceptable and hemodynamically stable  Respiratory status: acceptable and unassisted  Hydration status: acceptable  PONV: none     Anesthetic complications: None          Last vitals:  Vitals:    05/18/17 1410 05/18/17 1425 05/18/17 1455   BP:  144/71 148/70   Pulse:      Resp:  16 16   Temp:  36.4  C (97.6  F)    SpO2: 100% 93% 96%         Electronically Signed By: Daniel Regan MD  May 18, 2017  3:16 PM

## 2017-05-18 NOTE — PROGRESS NOTES
05/18/17 1600   Quick Adds   Type of Visit Initial PT Evaluation   Living Environment   Lives With alone   Living Arrangements house   Home Accessibility bed and bath on same level   Number of Stairs to Enter Home 1   Number of Stairs Within Home 12  (stairs into basement, does not need to negotiate)   Stair Railings at Home inside, present on left side;inside, present on right side  (none present outside)   Transportation Available family or friend will provide   Living Environment Comment Pt's son will stay overnight with pt first  night discharge home, neighbor who is a nurse will check in on pt mornings and evenings.    Self-Care   Dominant Hand right   Usual Activity Tolerance good   Current Activity Tolerance moderate   Regular Exercise no   Equipment Currently Used at Home none   Functional Level Prior   Ambulation 0-->independent   Transferring 0-->independent   Toileting 0-->independent   Bathing 0-->independent   Dressing 0-->independent   Eating 0-->independent   Communication 0-->understands/communicates without difficulty   Swallowing 0-->swallows foods/liquids without difficulty   Cognition 0 - no cognition issues reported   Fall history within last six months no   Which of the above functional risks had a recent onset or change? ambulation;transferring;fall history   Prior Functional Level Comment Pt reports independence with iADLs, ADLs, and functional mobility without the use of AD.    General Information   Onset of Illness/Injury or Date of Surgery - Date 05/18/17   Referring Physician Igor Mas MD   Patient/Family Goals Statement Discharge to home   Pertinent History of Current Problem (include personal factors and/or comorbidities that impact the POC) Pt is a 71 year old female with L TKA POD#0.  PMH includes moderate HTN, hyperlipidemia, obesity, and DICU.     Precautions/Limitations fall precautions   Weight-Bearing Status - LLE weight-bearing as tolerated   General Observations Pt  laying in bed upon arrival of therapist.    General Info Comments Ambulate with Assist. KI on at night.    Cognitive Status Examination   Orientation orientation to person, place and time   Level of Consciousness alert   Follows Commands and Answers Questions 100% of the time   Personal Safety and Judgment intact   Pain Assessment   Patient Currently in Pain Yes, see Vital Sign flowsheet   Integumentary/Edema   Integumentary/Edema Comments Incision covered with dressing, Hemovac present   Posture    Posture Not impaired   Range of Motion (ROM)   ROM Comment L knee ROM: 25-55,  limited by pain, otherwise bilateral LEs WFL   Strength   Strength Comments L LE strength limited by pain, R LE WFL assessed during functional mobility   Bed Mobility   Bed Mobility Comments Pt transferred sup<>sit at Min A at L LE.     Transfer Skills   Transfer Comments Pt transferred sit<>stand with FWW at [facility], pt demonstrates downward gaze and decreased trunk extension upon standing.    Gait   Gait Comments Pt ambulated 2 feet with FWW at CGA, pt ambulating with step to gait and downward gaze. Noted no L knee buckling with KI donned.    Balance   Balance Comments Seated balance good, pt able to sit EOB with no UE support with no LOB.  Standing balance is good, assessed during ambulation.   Sensory Examination   Sensory Perception other (describe)   Sensory Perception Comments numbness bilateral LEs   Coordination   Coordination Comments Good, pt able to adust gown, call light, and 02 sensor throughout session   Modality Interventions   Planned Modality Interventions Cryotherapy   Planned Modality Interventions Comments PRN.    General Therapy Interventions   Planned Therapy Interventions bed mobility training;gait training;ROM;strengthening;transfer training;progressive activity/exercise   Clinical Impression   Criteria for Skilled Therapeutic Intervention yes, treatment indicated   PT Diagnosis Impaired gait   Influenced by the following  "impairments Decreased strength L LE, pain L LE, decreased ROM L LE, fatigue   Functional limitations due to impairments Decreased functional independence   Clinical Presentation Stable/Uncomplicated   Clinical Presentation Rationale Due to PMH, current presentation, and social support   Clinical Decision Making (Complexity) Low complexity   Therapy Frequency` 2 times/day   Predicted Duration of Therapy Intervention (days/wks) 4   Anticipated Discharge Disposition Home with Outpatient Therapy   Risk & Benefits of therapy have been explained Yes   Patient, Family & other staff in agreement with plan of care Yes   [facility] AM-PAC  \"6 Clicks\" V.2 Basic Mobility Inpatient Short Form   1. Turning from your back to your side while in a flat bed without using bedrails? 4 - None   2. Moving from lying on your back to sitting on the side of a flat bed without using bedrails? 3 - A Little   3. Moving to and from a bed to a chair (including a wheelchair)? 3 - A Little   4. Standing up from a chair using your arms (e.g., wheelchair, or bedside chair)? 3 - A Little   5. To walk in hospital room? 3 - A Little   6. Climbing 3-5 steps with a railing? 2 - A Lot   Basic Mobility Raw Score (Score out of 24.Lower scores equate to lower levels of function) 18   Total Evaluation Time   Total Evaluation Time (Minutes) 10     "

## 2017-05-18 NOTE — ANESTHESIA PROCEDURE NOTES
Peripheral nerve/Neuraxial procedure note : intrathecal  Pre-Procedure  Performed by RASHID FLORES  Location: OR      Pre-Anesthestic Checklist: patient identified, IV checked, risks and benefits discussed, informed consent, monitors and equipment checked, pre-op evaluation and at physician/surgeon's request    Timeout  Correct Patient: Yes   Correct Procedure: Yes   Correct Site: Yes   Correct Laterality: N/A   Correct Position: Yes   Site Marked: N/A   .   Procedure Documentation    .    Procedure:    Intrathecal.  Insertion Site:L3-4  (midline approach)      Patient Prep;mask, sterile gloves, povidone-iodine 7.5% surgical scrub, patient draped.  .  Needle: (). # of attempts: 1. # of redirects:: 0.. Spinal Needle: [place] 24 G. 3.5 in.  Introducer used. Introducer: 20 G. .     Assessment/Narrative  Paresthesias: No.  .  .  clear CSF fluid removed . Comments:  Injected 13.5mg Bupivacaine 0.75% + dextrose  Patient tolerated the procedure well and without complications.  Patient laid flat immediately.

## 2017-05-19 ENCOUNTER — APPOINTMENT (OUTPATIENT)
Dept: PHYSICAL THERAPY | Facility: CLINIC | Age: 72
DRG: 470 | End: 2017-05-19
Attending: ORTHOPAEDIC SURGERY
Payer: MEDICARE

## 2017-05-19 LAB
CREAT SERPL-MCNC: 0.75 MG/DL (ref 0.52–1.04)
GFR SERPL CREATININE-BSD FRML MDRD: 76 ML/MIN/1.7M2
GLUCOSE SERPL-MCNC: 144 MG/DL (ref 70–99)
HGB BLD-MCNC: 10.6 G/DL (ref 11.7–15.7)
PLATELET # BLD AUTO: 173 10E9/L (ref 150–450)

## 2017-05-19 PROCEDURE — 85049 AUTOMATED PLATELET COUNT: CPT | Performed by: ORTHOPAEDIC SURGERY

## 2017-05-19 PROCEDURE — 97110 THERAPEUTIC EXERCISES: CPT | Mod: GP | Performed by: PHYSICAL THERAPIST

## 2017-05-19 PROCEDURE — 36415 COLL VENOUS BLD VENIPUNCTURE: CPT | Performed by: ORTHOPAEDIC SURGERY

## 2017-05-19 PROCEDURE — A9270 NON-COVERED ITEM OR SERVICE: HCPCS | Mod: GY | Performed by: ORTHOPAEDIC SURGERY

## 2017-05-19 PROCEDURE — 40000193 ZZH STATISTIC PT WARD VISIT: Performed by: PHYSICAL THERAPIST

## 2017-05-19 PROCEDURE — 82947 ASSAY GLUCOSE BLOOD QUANT: CPT | Performed by: ORTHOPAEDIC SURGERY

## 2017-05-19 PROCEDURE — 25800025 ZZH RX 258: Performed by: ORTHOPAEDIC SURGERY

## 2017-05-19 PROCEDURE — 25000128 H RX IP 250 OP 636: Performed by: ORTHOPAEDIC SURGERY

## 2017-05-19 PROCEDURE — 12000007 ZZH R&B INTERMEDIATE

## 2017-05-19 PROCEDURE — 25000132 ZZH RX MED GY IP 250 OP 250 PS 637: Mod: GY | Performed by: ORTHOPAEDIC SURGERY

## 2017-05-19 PROCEDURE — 85018 HEMOGLOBIN: CPT | Performed by: ORTHOPAEDIC SURGERY

## 2017-05-19 PROCEDURE — 82565 ASSAY OF CREATININE: CPT | Performed by: ORTHOPAEDIC SURGERY

## 2017-05-19 PROCEDURE — 97530 THERAPEUTIC ACTIVITIES: CPT | Mod: GP | Performed by: PHYSICAL THERAPIST

## 2017-05-19 PROCEDURE — 97116 GAIT TRAINING THERAPY: CPT | Mod: GP | Performed by: PHYSICAL THERAPIST

## 2017-05-19 RX ADMIN — ANASTROZOLE 1 MG: 1 TABLET, COATED ORAL at 08:05

## 2017-05-19 RX ADMIN — OXYCODONE HYDROCHLORIDE 5 MG: 5 TABLET ORAL at 04:45

## 2017-05-19 RX ADMIN — Medication 2.5 MG: at 18:32

## 2017-05-19 RX ADMIN — GABAPENTIN 100 MG: 100 CAPSULE ORAL at 17:04

## 2017-05-19 RX ADMIN — OXYCODONE HYDROCHLORIDE 10 MG: 10 TABLET, FILM COATED, EXTENDED RELEASE ORAL at 06:48

## 2017-05-19 RX ADMIN — Medication 2.5 MG: at 01:03

## 2017-05-19 RX ADMIN — GABAPENTIN 100 MG: 100 CAPSULE ORAL at 21:46

## 2017-05-19 RX ADMIN — OXYCODONE HYDROCHLORIDE 10 MG: 5 TABLET ORAL at 01:37

## 2017-05-19 RX ADMIN — ACETAMINOPHEN 975 MG: 325 TABLET, FILM COATED ORAL at 18:33

## 2017-05-19 RX ADMIN — ENOXAPARIN SODIUM 40 MG: 40 INJECTION SUBCUTANEOUS at 11:05

## 2017-05-19 RX ADMIN — OXYCODONE HYDROCHLORIDE 5 MG: 5 TABLET ORAL at 21:46

## 2017-05-19 RX ADMIN — OXYCODONE HYDROCHLORIDE 10 MG: 5 TABLET ORAL at 08:35

## 2017-05-19 RX ADMIN — CELECOXIB 200 MG: 200 CAPSULE ORAL at 21:46

## 2017-05-19 RX ADMIN — POTASSIUM CHLORIDE, DEXTROSE MONOHYDRATE AND SODIUM CHLORIDE: 150; 5; 450 INJECTION, SOLUTION INTRAVENOUS at 08:06

## 2017-05-19 RX ADMIN — LOSARTAN POTASSIUM AND HYDROCHLOROTHIAZIDE 1 TABLET: 50; 12.5 TABLET, FILM COATED ORAL at 21:46

## 2017-05-19 RX ADMIN — CEFAZOLIN SODIUM 2 G: 2 INJECTION, SOLUTION INTRAVENOUS at 04:46

## 2017-05-19 RX ADMIN — SENNOSIDES AND DOCUSATE SODIUM 2 TABLET: 8.6; 5 TABLET ORAL at 21:46

## 2017-05-19 RX ADMIN — CELECOXIB 200 MG: 200 CAPSULE ORAL at 08:05

## 2017-05-19 RX ADMIN — OXYCODONE HYDROCHLORIDE 10 MG: 5 TABLET ORAL at 13:03

## 2017-05-19 RX ADMIN — SENNOSIDES AND DOCUSATE SODIUM 1 TABLET: 8.6; 5 TABLET ORAL at 08:05

## 2017-05-19 RX ADMIN — VITAMIN D, TAB 1000IU (100/BT) 1000 UNITS: 25 TAB at 08:05

## 2017-05-19 RX ADMIN — ACETAMINOPHEN 975 MG: 325 TABLET, FILM COATED ORAL at 01:37

## 2017-05-19 RX ADMIN — OXYCODONE HYDROCHLORIDE 10 MG: 10 TABLET, FILM COATED, EXTENDED RELEASE ORAL at 18:32

## 2017-05-19 RX ADMIN — ATORVASTATIN CALCIUM 80 MG: 20 TABLET, FILM COATED ORAL at 21:45

## 2017-05-19 RX ADMIN — GABAPENTIN 100 MG: 100 CAPSULE ORAL at 08:05

## 2017-05-19 RX ADMIN — ONDANSETRON 4 MG: 2 SOLUTION INTRAMUSCULAR; INTRAVENOUS at 08:10

## 2017-05-19 RX ADMIN — ACETAMINOPHEN 975 MG: 325 TABLET, FILM COATED ORAL at 11:05

## 2017-05-19 NOTE — PLAN OF CARE
Problem: Knee Replacement, Total (Adult)  Goal: Signs and Symptoms of Listed Potential Problems Will be Absent or Manageable (Knee Replacement, Total)  Signs and symptoms of listed potential problems will be absent or manageable by discharge/transition of care (reference Knee Replacement, Total (Adult) CPG).   Outcome: No Change   CMS good to ft.  Osorio and HVC intact.  Knee drsg dry and intact.  Rating pain  #5--given Oxycodone, Oxycontin, Tylenol with supper.  Up out of bed with PT and took some steps.

## 2017-05-19 NOTE — PLAN OF CARE
Problem: Goal Outcome Summary  Goal: Goal Outcome Summary  Surgeon Discharge Plan: none noted, per patient, discharge to home     Current Functional Status: Pt transferred sup<>sit at Min A L LE.  Pt transferred sit<>stand with FWW at CGA.  Pt ambulated 30 ft with FWW while donning KI at [facility] with no buckling of L knee. L knee ROM 20-60.     Barriers to Plan/Home: stairs, (unable to assess), limited ambulation distance due to pain

## 2017-05-19 NOTE — PROGRESS NOTES
"Viji Sommer  2017  POD # 1    Doing well.  No immediate surgical complications identified.  Pain well-controlled.  Objective:  Blood pressure 148/71, pulse 61, temperature 98.6  F (37  C), temperature source Oral, resp. rate 16, height 1.702 m (5' 7\"), weight 86.6 kg (191 lb), SpO2 97 %.    Temperatures:  Current - Temp: 98.6  F (37  C); Max - Temp  Av.4  F (36.9  C)  Min: 97.6  F (36.4  C)  Max: 98.9  F (37.2  C)  Pulse range: Pulse  Av.7  Min: 56  Max: 65  Blood pressure range: Systolic (24hrs), Av , Min:124 , Max:164   ; Diastolic (24hrs), Av, Min:56, Max:82    Exam:  CMS: intact  alert, stable      Labs:  Recent Labs   Lab Test  17   2225  17   0923   POTASSIUM  3.6  3.0*     Recent Labs   Lab Test  17   0627   HGB  10.6*     No results for input(s): INR in the last 21887 hours.  Recent Labs   Lab Test  17   0627   PLT  173       PLAN:  Continue physical therapy  Pain control measures      "

## 2017-05-19 NOTE — PLAN OF CARE
Problem: Goal Outcome Summary  Goal: Goal Outcome Summary  Outcome: Improving  Pt A/O, VSS on 1-1.5L. CMS intact, Dressing c.d.i . Up with 1. HMV d/c'd. Pain managed with oxycodone. Progressing per plan of care. Will continue to monitor.

## 2017-05-19 NOTE — PLAN OF CARE
Problem: Goal Outcome Summary  Goal: Goal Outcome Summary  Outcome: No Change  Patient A&O. Elevated SBP, otherwise VSS. CMS intact. Dressing clean, dry, and intact. Hemovac intact. Patient taking PRN Oxycodone and Valium for pain. IV infusing. Osorio discontinued, patient due to void. Will continue to monitor.

## 2017-05-20 ENCOUNTER — APPOINTMENT (OUTPATIENT)
Dept: PHYSICAL THERAPY | Facility: CLINIC | Age: 72
DRG: 470 | End: 2017-05-20
Attending: ORTHOPAEDIC SURGERY
Payer: MEDICARE

## 2017-05-20 ENCOUNTER — APPOINTMENT (OUTPATIENT)
Dept: OCCUPATIONAL THERAPY | Facility: CLINIC | Age: 72
DRG: 470 | End: 2017-05-20
Attending: ORTHOPAEDIC SURGERY
Payer: MEDICARE

## 2017-05-20 LAB
GLUCOSE SERPL-MCNC: 103 MG/DL (ref 70–99)
HGB BLD-MCNC: 9.8 G/DL (ref 11.7–15.7)

## 2017-05-20 PROCEDURE — 40000193 ZZH STATISTIC PT WARD VISIT

## 2017-05-20 PROCEDURE — 40000133 ZZH STATISTIC OT WARD VISIT: Performed by: OCCUPATIONAL THERAPIST

## 2017-05-20 PROCEDURE — 25000132 ZZH RX MED GY IP 250 OP 250 PS 637: Mod: GY | Performed by: ORTHOPAEDIC SURGERY

## 2017-05-20 PROCEDURE — 97110 THERAPEUTIC EXERCISES: CPT | Mod: GP

## 2017-05-20 PROCEDURE — 97165 OT EVAL LOW COMPLEX 30 MIN: CPT | Mod: GO | Performed by: OCCUPATIONAL THERAPIST

## 2017-05-20 PROCEDURE — 97116 GAIT TRAINING THERAPY: CPT | Mod: GP

## 2017-05-20 PROCEDURE — 97530 THERAPEUTIC ACTIVITIES: CPT | Mod: GO | Performed by: OCCUPATIONAL THERAPIST

## 2017-05-20 PROCEDURE — A9270 NON-COVERED ITEM OR SERVICE: HCPCS | Mod: GY | Performed by: ORTHOPAEDIC SURGERY

## 2017-05-20 PROCEDURE — 25000125 ZZHC RX 250: Performed by: ORTHOPAEDIC SURGERY

## 2017-05-20 PROCEDURE — 97535 SELF CARE MNGMENT TRAINING: CPT | Mod: GO | Performed by: OCCUPATIONAL THERAPIST

## 2017-05-20 PROCEDURE — 85018 HEMOGLOBIN: CPT | Performed by: ORTHOPAEDIC SURGERY

## 2017-05-20 PROCEDURE — 36415 COLL VENOUS BLD VENIPUNCTURE: CPT | Performed by: ORTHOPAEDIC SURGERY

## 2017-05-20 PROCEDURE — 82947 ASSAY GLUCOSE BLOOD QUANT: CPT | Performed by: ORTHOPAEDIC SURGERY

## 2017-05-20 PROCEDURE — 97530 THERAPEUTIC ACTIVITIES: CPT | Mod: GP

## 2017-05-20 PROCEDURE — 12000007 ZZH R&B INTERMEDIATE

## 2017-05-20 PROCEDURE — 25000128 H RX IP 250 OP 636: Performed by: ORTHOPAEDIC SURGERY

## 2017-05-20 RX ORDER — AMOXICILLIN 250 MG
1-2 CAPSULE ORAL 2 TIMES DAILY PRN
Qty: 100 TABLET | Refills: 0 | Status: SHIPPED | OUTPATIENT
Start: 2017-05-20

## 2017-05-20 RX ORDER — CELECOXIB 200 MG/1
200 CAPSULE ORAL DAILY
Qty: 30 CAPSULE | Refills: 0 | Status: SHIPPED | OUTPATIENT
Start: 2017-05-20

## 2017-05-20 RX ORDER — ASPIRIN 325 MG
325 TABLET ORAL 2 TIMES DAILY
Qty: 90 TABLET | Refills: 0 | Status: SHIPPED | OUTPATIENT
Start: 2017-05-20

## 2017-05-20 RX ORDER — OXYCODONE HYDROCHLORIDE 5 MG/1
5-10 TABLET ORAL
Qty: 60 TABLET | Refills: 0 | Status: SHIPPED | OUTPATIENT
Start: 2017-05-20

## 2017-05-20 RX ORDER — OXYCODONE HCL 10 MG/1
10 TABLET, FILM COATED, EXTENDED RELEASE ORAL EVERY 12 HOURS
Qty: 10 TABLET | Refills: 0 | Status: SHIPPED | OUTPATIENT
Start: 2017-05-20

## 2017-05-20 RX ORDER — LANOLIN ALCOHOL/MO/W.PET/CERES
3 CREAM (GRAM) TOPICAL
Qty: 60 TABLET | Refills: 0 | Status: SHIPPED | OUTPATIENT
Start: 2017-05-20

## 2017-05-20 RX ADMIN — SENNOSIDES AND DOCUSATE SODIUM 2 TABLET: 8.6; 5 TABLET ORAL at 10:06

## 2017-05-20 RX ADMIN — GABAPENTIN 100 MG: 100 CAPSULE ORAL at 17:53

## 2017-05-20 RX ADMIN — PROCHLORPERAZINE EDISYLATE 5 MG: 5 INJECTION INTRAMUSCULAR; INTRAVENOUS at 10:12

## 2017-05-20 RX ADMIN — GABAPENTIN 100 MG: 100 CAPSULE ORAL at 22:34

## 2017-05-20 RX ADMIN — VITAMIN D, TAB 1000IU (100/BT) 1000 UNITS: 25 TAB at 10:06

## 2017-05-20 RX ADMIN — ACETAMINOPHEN 975 MG: 325 TABLET, FILM COATED ORAL at 17:53

## 2017-05-20 RX ADMIN — ACETAMINOPHEN 975 MG: 325 TABLET, FILM COATED ORAL at 01:19

## 2017-05-20 RX ADMIN — OXYCODONE HYDROCHLORIDE 10 MG: 10 TABLET, FILM COATED, EXTENDED RELEASE ORAL at 17:53

## 2017-05-20 RX ADMIN — SENNOSIDES AND DOCUSATE SODIUM 2 TABLET: 8.6; 5 TABLET ORAL at 22:34

## 2017-05-20 RX ADMIN — ONDANSETRON 4 MG: 4 TABLET, ORALLY DISINTEGRATING ORAL at 13:48

## 2017-05-20 RX ADMIN — OXYCODONE HYDROCHLORIDE 10 MG: 10 TABLET, FILM COATED, EXTENDED RELEASE ORAL at 06:24

## 2017-05-20 RX ADMIN — ENOXAPARIN SODIUM 40 MG: 40 INJECTION SUBCUTANEOUS at 10:12

## 2017-05-20 RX ADMIN — ACETAMINOPHEN 975 MG: 325 TABLET, FILM COATED ORAL at 10:05

## 2017-05-20 RX ADMIN — ANASTROZOLE 1 MG: 1 TABLET, COATED ORAL at 10:06

## 2017-05-20 RX ADMIN — ATORVASTATIN CALCIUM 80 MG: 20 TABLET, FILM COATED ORAL at 22:34

## 2017-05-20 RX ADMIN — CELECOXIB 200 MG: 200 CAPSULE ORAL at 10:06

## 2017-05-20 RX ADMIN — ONDANSETRON 4 MG: 2 SOLUTION INTRAMUSCULAR; INTRAVENOUS at 06:17

## 2017-05-20 RX ADMIN — OXYCODONE HYDROCHLORIDE 10 MG: 5 TABLET ORAL at 13:48

## 2017-05-20 RX ADMIN — GABAPENTIN 100 MG: 100 CAPSULE ORAL at 10:06

## 2017-05-20 RX ADMIN — OXYCODONE HYDROCHLORIDE 10 MG: 5 TABLET ORAL at 08:23

## 2017-05-20 RX ADMIN — OXYCODONE HYDROCHLORIDE 5 MG: 5 TABLET ORAL at 01:19

## 2017-05-20 ASSESSMENT — ACTIVITIES OF DAILY LIVING (ADL): PREVIOUS_RESPONSIBILITIES: MEAL PREP;HOUSEKEEPING;LAUNDRY;SHOPPING;YARDWORK;MEDICATION MANAGEMENT;FINANCES;DRIVING

## 2017-05-20 NOTE — PLAN OF CARE
Problem: Goal Outcome Summary  Goal: Goal Outcome Summary  OT: Orders received, eval complete, tx initiated. Pt is a 70 yo F seen POD #2 of L TKA with WBAT on L LE and KI use in PM only. Prior, pt reports living in a house alone with 1 step to enter and stairs to basement but pt reports all needs can be met on main level. Pt has walk-in shower with built-in bench. Pt reports prior I with all ADLs/IADLs including driving, but pt reports son or neighbor will assist with transportation on return home. Pt reports neighbors will check on her morning and evening to provide A with ADLs/IADLs as needed.     Surgeon Discharge Plan: none noted, per pt plans to d/c home with A from son for 1st night     Current Functional Status: CGA-min A for bed mobility, SBA for functional transfers and ambulation, min A for LB dressing, I for toileting, SBA for G/H     Barriers to Plan/Home: stair to enter, pain     Occupational Therapy Discharge Summary     Reason for therapy discharge:    All goals and outcomes met, no further needs identified.     Progress towards therapy goal(s). See goals on Care Plan in [facility] record for goal details.  Goals met     Therapy recommendation(s):    No further therapy is recommended.

## 2017-05-20 NOTE — PLAN OF CARE
Problem: Goal Outcome Summary  Goal: Goal Outcome Summary  Outcome: Improving  Pt A/O, VSS on RA. Up with 1. CMS intact. Dressing changed, site WNL. Pain managed with oxycodone. C/o intermittent nausea, finding relief with Zofran and compazine. Progressing per plan of care. Will continue to monitor.

## 2017-05-20 NOTE — PROGRESS NOTES
"Viji Sommer  2017  POD # 2    Doing well.  Clean wound without signs of infection.  Normal healing wound.  No immediate surgical complications identified.  No excessive bleeding  Pain well-controlled.  Tolerating physical therapy and rehabilitation well.  Objective:  Blood pressure 107/43, pulse 65, temperature 98.7  F (37.1  C), resp. rate 16, height 1.702 m (5' 7\"), weight 86.6 kg (191 lb), SpO2 95 %.    Temperatures:  Current - Temp: 98.7  F (37.1  C); Max - Temp  Av.9  F (37.2  C)  Min: 98.6  F (37  C)  Max: 99.1  F (37.3  C)  Pulse range: Pulse  Av.8  Min: 59  Max: 65  Blood pressure range: Systolic (24hrs), Av , Min:106 , Max:151   ; Diastolic (24hrs), Av, Min:41, Max:71    Exam:  CMS: intact  alert, stable, wound ok      Labs:  Recent Labs   Lab Test  17   2225  17   0923   POTASSIUM  3.6  3.0*     Recent Labs   Lab Test  17   0541  17   0627   HGB  9.8*  10.6*     No results for input(s): INR in the last 83531 hours.  Recent Labs   Lab Test  17   0627   PLT  173       PLAN:  Continue physical therapy  Pain control measures      "

## 2017-05-20 NOTE — PROGRESS NOTES
05/20/17 0940   Quick Adds   Type of Visit Initial Occupational Therapy Evaluation   Living Environment   Lives With alone   Living Arrangements house   Home Accessibility stairs to enter home;stairs within home   Number of Stairs to Enter Home 1   Number of Stairs Within Home 12  (Stairs to basement, but nothing needed on that level)   Stair Railings at Home inside, present at both sides   Transportation Available car;family or friend will provide  (pt drives at baseline, son or neighbor will A for now)   Living Environment Comment Pt reports son will stay with for first night, neighbors will check on her morning and evening   Self-Care   Dominant Hand right   Usual Activity Tolerance good   Current Activity Tolerance moderate   Regular Exercise other (see comments)  (would like to return to walking and golfing)   Equipment Currently Used at Home none  (Pt reports seat built into shower, is getting toilet riser)   Functional Level Prior   Ambulation 0-->independent   Transferring 0-->independent   Toileting 0-->independent   Bathing 0-->independent   Dressing 0-->independent   Eating 0-->independent   Communication 0-->understands/communicates without difficulty   Swallowing 0-->swallows foods/liquids without difficulty   Cognition 0 - no cognition issues reported   Fall history within last six months no   Which of the above functional risks had a recent onset or change? ambulation;transferring;toileting;bathing;dressing   Prior Functional Level Comment Pt reports prior I with all ADLs/IADLs   General Information   Onset of Illness/Injury or Date of Surgery - Date 05/18/17   Referring Physician Igor Mas MD   Additional Occupational Profile Info/Pertinent History of Current Problem Pt is a 70 yo F seen POD #2 of L TKA.   Weight-Bearing Status - LLE weight-bearing as tolerated   Cognitive Status Examination   Orientation orientation to person, place and time   Visual Perception   Visual Perception Wears  glasses  (for reading)   Pain Assessment   Patient Currently in Pain Yes, see Vital Sign flowsheet  (0/10 in bed, 2/10 with ambulation)   Mobility   Bed Mobility Bed mobility skill: Rolling/Turning;Bed mobility skill: Scooting/Bridging;Bed mobility skill: Sit to supine;Bed mobility skill: Supine to sit;Bed mobility analysis   Bed Mobility Skill: Rolling/Turning   Level of [place] - Bed Mobility Skill Rolling Turning stand-by assist   Assistive Device:  Rolling/Turning bed rails   Bed Mobility Skill: Scooting/Bridging   Level of [place]: Scooting/Bridging minimum assist (75% patients effort)   Assistive Device: Scooting/Bridging bedrail   Bed Mobility Skill: Sit to Supine   Level of [place]: Sit/Supine minimum assist (75% patients effort)   Assistive Device: Sit/Supine bedrail   Bed Mobility Skill: Supine to Sit   Level of [place]: Supine/Sit contact guard   Assistive Device: Supine/Sit bedrail   Bed Mobility Analysis   Bed Mobility Limitations decreased ability to use legs for bridging/pushing   Impairments Contributing to Impaired Bed Mobility pain;decreased ROM;decreased strength   Transfer Skills   Transfer Transfer Safety Analysis Bed/Chair;Transfer Skill: Stand to Sit;Transfer Safety Analysis Sit/Stand   Transfer Skill: Bed to Chair/Chair to Bed   Level of [place]: Bed to Chair stand-by assist   Weight-Bearing Restrictions weight-bearing as tolerated   Assistive Device - Transfer Skill Bed to Chair Chair to Bed Rehab Eval standard walker   Transfer Safety Analysis Bed/Chair   Transfer Safety Concerns Noted decreased step length   Impairments Contributing to Impaired Transfers pain;decreased strength;decreased ROM   Transfer Skill: Sit to Stand   Level of [place]: Sit/Stand stand-by assist   Transfer Skill: Sit to Stand weight-bearing as tolerated   Assistive Device for Transfer: Sit/Stand standard walker   Transfer Safety Analysis Sit/Stand   Impaired Transfers: Sit/Stand  "pain;decreased strength;decreased ROM   Toilet Transfer   Toilet Transfer Toilet Transfer Skill;Toilet Transfer Safety Analysis   Transfer Skill: Toilet Transfer   Level of [place]: Toilet stand-by assist   Weight-Bearing Restrictions: Toilet weight-bearing as tolerated   Assistive Device standard walker;grab bars;seat riser   Transfer Safety Analysis Toilet   Transfer Safety Analysis Toilet decreased strength;pain   Lower Body Dressing   Level of [place]: Dress Lower Body minimum assist (75% patients effort)  (I for R LE, min A for L LE)   Toileting   Level of [place]: Toilet independent   Assistive Device raised toilet seat   Grooming   Level of [place]: Grooming stand-by assist   Instrumental Activities of Daily Living (IADL)   Previous Responsibilities meal prep;housekeeping;laundry;shopping;yardwork;medication management;finances;driving   IADL Comments Pt reports prior I in all IADLs, is retired   Activities of Daily Living Analysis   Impairments Contributing to Impaired Activities of Daily Living pain;strength decreased;ROM decreased   General Therapy Interventions   Planned Therapy Interventions ADL retraining;transfer training   Clinical Impression   Criteria for Skilled Therapeutic Interventions Met yes, treatment indicated   OT Diagnosis decreased I and safety in ADLs   Influenced by the following impairments decreased strength, pain, ROM in L LE   Assessment of Occupational Performance 1-3 Performance Deficits   Identified Performance Deficits dressing, bathing, functional transfers   Clinical Decision Making (Complexity) Low complexity   Therapy Frequency other (see comments)  (1x)   Predicted Duration of Therapy Intervention (days/wks) 1 day   Anticipated Discharge Disposition (per surgeon)   Risks and Benefits of Treatment have been explained. Yes   Patient, Family & other staff in agreement with plan of care Yes   [facility] AM-PAC TM \"6 Clicks\"   2016, Trustees of [employer] " "[place], under license to [employer].  All rights reserved.   6 Clicks Short Forms Daily Activity Inpatient Short Form   [facility] AM-PAC  \"6 Clicks\" Daily Activity Inpatient Short Form   1. Putting on and taking off regular lower body clothing? 3 - A Little   2. Bathing (including washing, rinsing, drying)? 3 - A Little   3. Toileting, which includes using toilet, bedpan or urinal? 4 - None   4. Putting on and taking off regular upper body clothing? 4 - None   5. Taking care of personal grooming such as brushing teeth? 4 - None   6. Eating meals? 4 - None   Daily Activity Raw Score (Score out of 24.Lower scores equate to lower levels of function) 22   Total Evaluation Time   Total Evaluation Time (Minutes) 15     "

## 2017-05-20 NOTE — PLAN OF CARE
Problem: Goal Outcome Summary  Goal: Goal Outcome Summary  Outcome: Improving  A&O, vs stable, cms intact, drsg CDI.  Up with assist x 1 and walker, voiding, pain controlled with oxycodone.  Progressing per plan of care.

## 2017-05-20 NOTE — PLAN OF CARE
Problem: Goal Outcome Summary  Goal: Goal Outcome Summary  Outcome: Improving  A/O x4. Up Ax1 with walker. VSS. CMS intact. Drsg C/D/I. Pain controlled with oxyCODONE and valium. Voiding adequately per BR. Progressing per POC. Will cont to monitor.

## 2017-05-20 NOTE — PLAN OF CARE
Problem: Goal Outcome Summary  Goal: Goal Outcome Summary  Surgeon Discharge Plan: None noted     Current Functional Status: Pt ambulates 75 feet x 2 SBA with FWW, 13-78 left knee ROM, transfers sit/supine Min A LEs, sit/stand SBA     Barriers to Plan/Home: Assist needed for transfers and ambulation.

## 2017-05-21 ENCOUNTER — APPOINTMENT (OUTPATIENT)
Dept: PHYSICAL THERAPY | Facility: CLINIC | Age: 72
DRG: 470 | End: 2017-05-21
Attending: ORTHOPAEDIC SURGERY
Payer: MEDICARE

## 2017-05-21 VITALS
HEART RATE: 65 BPM | RESPIRATION RATE: 16 BRPM | SYSTOLIC BLOOD PRESSURE: 109 MMHG | TEMPERATURE: 98.3 F | HEIGHT: 67 IN | OXYGEN SATURATION: 95 % | WEIGHT: 191 LBS | BODY MASS INDEX: 29.98 KG/M2 | DIASTOLIC BLOOD PRESSURE: 54 MMHG

## 2017-05-21 PROCEDURE — 40000193 ZZH STATISTIC PT WARD VISIT: Performed by: PHYSICAL THERAPIST

## 2017-05-21 PROCEDURE — 97530 THERAPEUTIC ACTIVITIES: CPT | Mod: GP | Performed by: PHYSICAL THERAPIST

## 2017-05-21 PROCEDURE — 97116 GAIT TRAINING THERAPY: CPT | Mod: GP | Performed by: PHYSICAL THERAPIST

## 2017-05-21 PROCEDURE — 97110 THERAPEUTIC EXERCISES: CPT | Mod: GP | Performed by: PHYSICAL THERAPIST

## 2017-05-21 PROCEDURE — 25000128 H RX IP 250 OP 636: Performed by: ORTHOPAEDIC SURGERY

## 2017-05-21 PROCEDURE — 25000132 ZZH RX MED GY IP 250 OP 250 PS 637: Mod: GY | Performed by: ORTHOPAEDIC SURGERY

## 2017-05-21 PROCEDURE — A9270 NON-COVERED ITEM OR SERVICE: HCPCS | Mod: GY | Performed by: ORTHOPAEDIC SURGERY

## 2017-05-21 RX ADMIN — OXYCODONE HYDROCHLORIDE 5 MG: 5 TABLET ORAL at 09:13

## 2017-05-21 RX ADMIN — SENNOSIDES AND DOCUSATE SODIUM 1 TABLET: 8.6; 5 TABLET ORAL at 09:11

## 2017-05-21 RX ADMIN — GABAPENTIN 100 MG: 100 CAPSULE ORAL at 09:11

## 2017-05-21 RX ADMIN — ANASTROZOLE 1 MG: 1 TABLET, COATED ORAL at 09:11

## 2017-05-21 RX ADMIN — VITAMIN D, TAB 1000IU (100/BT) 1000 UNITS: 25 TAB at 09:11

## 2017-05-21 RX ADMIN — ENOXAPARIN SODIUM 40 MG: 40 INJECTION SUBCUTANEOUS at 11:43

## 2017-05-21 RX ADMIN — CELECOXIB 200 MG: 200 CAPSULE ORAL at 09:11

## 2017-05-21 RX ADMIN — OXYCODONE HYDROCHLORIDE 5 MG: 5 TABLET ORAL at 14:26

## 2017-05-21 NOTE — PROGRESS NOTES
POD#3 L TKA    Doing well  No new complaints  AVSS  CMS intact  Calf soft and nontender  Plan home today after PT x 2    Alfred Landin

## 2017-05-21 NOTE — DISCHARGE INSTRUCTIONS
"TOTAL KNEE REPLACEMENT TAKE HOME INSTRUCTIONS  Your surgeon will answer any questions about your progress. General guidelines for your care are listed below. Your surgeon may give additional instructions for your care at home. Please follow them carefully    Activity Level  1. Physical activity may be resumed gradually according to your comfort level and your surgeon s instructions. Follow your exercise program as instructed by your therapist. Do exercises at least twice a day. you may ice your knee after exercising.  2. Complete exercises two hours before bedtime to minimize the effect pain may have on sleep.  Refer to pages 19-22 of you \"Total Knee Replacement\" booklet for details  3. Do not wear your knee immobilizer unless your doctor has specifically told you to continue it.    Good Health Practices  1. Maintain an adequate fluid intake and eat a well balanced diet.  2. Be sure to include the basic food groups, such as dairy products, meat/fish, vegetables, and fruit. Each of these foods contribute to your wound healing and increasing your strength.  3. Surgery, decreased activity and pain medication all contribute to a decrease in bowel activity that can result in constipation. It is recommended that you increase your liquid intake, add fiber to your diet, increase activity, and decrease pain medication use. If you have any problems, notify your physician.  4. Wear your anti-embolism stockings day and night until seen by your surgeon if you were issued these at discharge.  (Not all patients will have anti-embolism stockings) Remove twice a day for one hour at a time. You may hand wash and air dry your stockings.  5. Notify your dentist of your total knee surgery and call your dentist one week before a dental appointment for antibiotics, if your dentist will not prescribe antibiotics then call your surgeon to ask for next steps.      Incision/Dressing Care  1. Keep incision clean and dry per surgeons " instructions  2. Cover incision if you are still having drainage.  3.  May Shower.    Things to Watch For  1. Check incision daily for increased redness, tenderness, swelling, or drainage along the incision line. If these occur, please notify your doctor. Also, call if you develop a fever above 101 .  2. Please notify your doctor if you experience any calf pain and/or if you have surgical pain not relieved by the pain medication prescribed by your doctor.        Revised 05/8/17

## 2017-05-21 NOTE — PLAN OF CARE
Problem: Goal Outcome Summary  Goal: Goal Outcome Summary  Surgeon Discharge Plan: Plan home today after PT x 2     Current Functional Status: Pt requires SBA for transfers and bed mobility with KI donned and cuing for technique. Ambulated x 50 ft with FWW and KI, close SBA and no buckling with KI and cuing for safety and WBAT. Pt demos continued weakness in L quad with SAQ and SLR needing mod A to complete antigravity ROM. AAROM after exercises was 4-57 degrees of L knee.     Barriers to Plan/Home: Continued quad weakness with mod A for SLR and SAQ, but no barriers if wearing KI for stairs/ gait at this time.

## 2017-05-21 NOTE — PLAN OF CARE
Problem: Goal Outcome Summary  Goal: Goal Outcome Summary  Physical Therapy Discharge Summary     Reason for therapy discharge:    Discharged to home with outpatient therapy.     Progress towards therapy goal(s). See goals on Care Plan in [facility] electronic health record for goal details.  Goals partially met.  Barriers to achieving goals:   discharge from facility.     Therapy recommendation(s):    Continued therapy is recommended.  Rationale/Recommendations:  to progress functional strength, ROM and balance for increased indep and safety with household and community mobility.

## 2017-05-21 NOTE — PLAN OF CARE
Problem: Goal Outcome Summary  Goal: Goal Outcome Summary  Outcome: No Change  A/O x4. Lethargic. Up Ax1 BR. Voiding adequately per BR. VSS-BP soft, bradycardia. CMS intact. Drsg C/D/I. Denies pain. Plan to DC 5/21/17.

## 2017-05-21 NOTE — DISCHARGE SUMMARY
DATE OF ADMISSION: 2017   DATE OF DISCHARGE:  2017      ADMITTING DIAGNOSIS:  Left knee degenerative arthrosis.      PROCEDURE:  Left total knee arthroplasty.      HOSPITAL COURSE:  Ms. Laura Sommer was admitted.  She underwent the above-noted procedure.  She tolerated it well.  Postoperatively, she mobilized, stabilized and was ready for discharge home.      DISCHARGE MEDICATIONS:  Those of admission plus.   1.  OxyContin and oxycodone for pain.   2.  Celebrex for pain.   3.  Senokot-S for stool softening.   4.  Aspirin twice a day for DVT prophylaxis.     5.  Melatonin for sleep.      DISCHARGE ACTIVITY:  Weightbear as tolerated left lower extremity.  Participate in outpatient physical therapy.      DISCHARGE FOLLOWUP:  She should have a daily dry dressing change until her wound is dry.  She should wear SUMIT hose during the day until seen in followup.  They may be off at night.  She should wear a knee immobilizer at bedtime for 7 days after discharge.  Followup with Dr. Kirk 2 weeks postop for staple removal and wound check and x-rays.      DISCHARGE FOLLOWUP:  Outpatient physical therapy as soon as possible.         YVETTE KIRK MD             D: 2017 10:40   T: 2017 10:59   MT: MED#[id number]      Name:     LAURA SOMMER   MRN:      [id number]        Account:        [id number]   :      1945           Admit Date:     [id number]                                  Discharge Date: 2017      Document: [id number]

## 2017-05-21 NOTE — PROGRESS NOTES
Pt VSS on RA. New dressing applied. Pt will discharge to home with son, he will be caring for her. All instructions and prescriptions given.

## 2017-09-06 ENCOUNTER — DOCUMENTATION ONLY (OUTPATIENT)
Dept: OTHER | Facility: CLINIC | Age: 72
End: 2017-09-06

## 2017-09-06 PROBLEM — Z71.89 ACP (ADVANCE CARE PLANNING): Chronic | Status: ACTIVE | Noted: 2017-09-06

## 2017-12-07 ENCOUNTER — HOSPITAL ENCOUNTER (OUTPATIENT)
Facility: CLINIC | Age: 72
Discharge: HOME | End: 2017-12-07
Attending: COLON & RECTAL SURGERY | Admitting: COLON & RECTAL SURGERY
Payer: MEDICARE

## 2017-12-07 ENCOUNTER — SURGERY (OUTPATIENT)
Age: 72
End: 2017-12-07

## 2017-12-07 VITALS
BODY MASS INDEX: 28.25 KG/M2 | HEIGHT: 67 IN | OXYGEN SATURATION: 100 % | WEIGHT: 180 LBS | SYSTOLIC BLOOD PRESSURE: 120 MMHG | DIASTOLIC BLOOD PRESSURE: 73 MMHG

## 2017-12-07 LAB — COLONOSCOPY: NORMAL

## 2017-12-07 PROCEDURE — G0500 MOD SEDAT ENDO SERVICE >5YRS: HCPCS | Performed by: COLON & RECTAL SURGERY

## 2017-12-07 PROCEDURE — G0105 COLORECTAL SCRN; HI RISK IND: HCPCS | Performed by: COLON & RECTAL SURGERY

## 2017-12-07 PROCEDURE — 45378 DIAGNOSTIC COLONOSCOPY: CPT | Performed by: COLON & RECTAL SURGERY

## 2017-12-07 PROCEDURE — 25000128 H RX IP 250 OP 636: Performed by: COLON & RECTAL SURGERY

## 2017-12-07 RX ORDER — ONDANSETRON 2 MG/ML
4 INJECTION INTRAMUSCULAR; INTRAVENOUS EVERY 6 HOURS PRN
Status: DISCONTINUED | OUTPATIENT
Start: 2017-12-07 | End: 2017-12-07 | Stop reason: HOSPADM

## 2017-12-07 RX ORDER — LIDOCAINE 40 MG/G
CREAM TOPICAL
Status: DISCONTINUED | OUTPATIENT
Start: 2017-12-07 | End: 2017-12-07 | Stop reason: HOSPADM

## 2017-12-07 RX ORDER — ONDANSETRON 4 MG/1
4 TABLET, ORALLY DISINTEGRATING ORAL EVERY 6 HOURS PRN
Status: DISCONTINUED | OUTPATIENT
Start: 2017-12-07 | End: 2017-12-07 | Stop reason: HOSPADM

## 2017-12-07 RX ORDER — FENTANYL CITRATE 50 UG/ML
INJECTION, SOLUTION INTRAMUSCULAR; INTRAVENOUS PRN
Status: DISCONTINUED | OUTPATIENT
Start: 2017-12-07 | End: 2017-12-07 | Stop reason: HOSPADM

## 2017-12-07 RX ORDER — NALOXONE HYDROCHLORIDE 0.4 MG/ML
.1-.4 INJECTION, SOLUTION INTRAMUSCULAR; INTRAVENOUS; SUBCUTANEOUS
Status: DISCONTINUED | OUTPATIENT
Start: 2017-12-07 | End: 2017-12-07 | Stop reason: HOSPADM

## 2017-12-07 RX ORDER — FLUMAZENIL 0.1 MG/ML
0.2 INJECTION, SOLUTION INTRAVENOUS
Status: DISCONTINUED | OUTPATIENT
Start: 2017-12-07 | End: 2017-12-07 | Stop reason: HOSPADM

## 2017-12-07 RX ORDER — ONDANSETRON 2 MG/ML
4 INJECTION INTRAMUSCULAR; INTRAVENOUS
Status: DISCONTINUED | OUTPATIENT
Start: 2017-12-07 | End: 2017-12-07 | Stop reason: HOSPADM

## 2017-12-07 RX ADMIN — FENTANYL CITRATE 100 MCG: 50 INJECTION, SOLUTION INTRAMUSCULAR; INTRAVENOUS at 15:39

## 2017-12-07 RX ADMIN — MIDAZOLAM 2 MG: 1 INJECTION INTRAMUSCULAR; INTRAVENOUS at 15:39

## 2017-12-07 NOTE — BRIEF OP NOTE
[facility] Brief Operative Note    Pre-operative diagnosis: SCREENING/ mother with colon cancer   Post-operative diagnosis normal colonoscopy   Procedure: Procedure(s):  COLONOSCOPY  - Wound Class: II-Clean Contaminated   Surgeon(s): Surgeon(s) and Role:     * Antwon Deluna MD - Primary   Estimated blood loss: * No values recorded between 12/7/2017 12:00 AM and 12/7/2017  4:00 PM *    Specimens: * No specimens in log *   Findings: normal colonoscopy  See provation procedure note in chart review.    Antwon Deluna MD  [facility]  [phone]

## 2017-12-07 NOTE — H&P
[facility]    History and Physical  Colon and Rectal Surgery     Date of Admission:  12/7/2017      Assessment & Plan   Viji Sommer is a 72 year old female who presents for colonoscopy.    Indication: screening  Plan for Colonoscopy with possible biopsy, possible polypectomy. We discussed the risks, benefits and alternatives and the patient wished to proceed.    The above has been forwarded to the consulting provider.      Antwon Deluna MD  [facility], [facility]  [phone]        Code Status   Full Code    Primary Care Physician   Jazmin Aj      History is obtained from the patient    History of Present Illness   Viji Sommer is a 72 year old female who presents for screening    Past Medical History    I have reviewed this patient's medical history and updated it with pertinent information if needed.   Past Medical History:   Diagnosis Date     Arthritis     knees     DCIS (ductal carcinoma in situ)      Hyperlipidemia      Hypertension      Obesity, unspecified        Past Surgical History   I have reviewed this patient's surgical history and updated it with pertinent information if needed.  Past Surgical History:   Procedure Laterality Date     ARTHROPLASTY KNEE Left 5/18/2017    Procedure: ARTHROPLASTY KNEE;  LEFT TOTAL KNEE ARTHROPLASTY;  Surgeon: Igor Mas MD;  Location: [facility] OR     BREAST SURGERY      right lumpectomy 2016     CATARACT IOL, RT/LT       EYE SURGERY      Lasik  ,  right eye scar tissue removed 2016     GYN SURGERY      tubal ligation     ORTHOPEDIC SURGERY      right knee arthroscopy 2005, left knee arthroscopic 2014       Prior to Admission Medications   Prior to Admission Medications   Prescriptions Last Dose Informant Patient Reported? Taking?   ANASTROZOLE PO 12/7/2017 Self Yes Yes   Sig: Take 1 mg by mouth daily   Atorvastatin Calcium (LIPITOR PO) 12/7/2017 Self Yes Yes   Sig: Take 80 mg by mouth At Bedtime   VITAMIN D,  CHOLECALCIFEROL, PO Past Week Self Yes Yes   Sig: Take 1,000 Units by mouth daily   aspirin 325 MG tablet Unknown  No No   Sig: Take 1 tablet (325 mg) by mouth 2 times daily   celecoxib (CELEBREX) 200 MG capsule Unknown  No No   Sig: Take 1 capsule (200 mg) by mouth daily   losartan-hydrochlorothiazide (HYZAAR) 50-12.5 MG per tablet 12/7/2017 Self Yes Yes   Sig: Take 1 tablet by mouth At Bedtime   melatonin 3 MG tablet Unknown  No No   Sig: Take 1 tablet (3 mg) by mouth nightly as needed for sleep   oxyCODONE (OXYCONTIN) 10 MG 12 hr tablet Unknown  No No   Sig: Take 1 tablet (10 mg) by mouth every 12 hours   oxyCODONE (ROXICODONE) 5 MG IR tablet Unknown  No No   Sig: Take 1-2 tablets (5-10 mg) by mouth every 3 hours as needed for moderate to severe pain   senna-docusate (SENOKOT-S;PERICOLACE) 8.6-50 MG per tablet 12/6/2017  No Yes   Sig: Take 1-2 tablets by mouth 2 times daily as needed for constipation      Facility-Administered Medications: None     Allergies   Allergies   Allergen Reactions     Lisinopril Cough       Social History   I have reviewed this patient's social history and updated it with pertinent information if needed. Viji Sommer  reports that she has never smoked. She does not have any smokeless tobacco history on file. She reports that she drinks alcohol. She reports that she does not use illicit drugs.    Family History   I have reviewed this patient's family history and updated it with pertinent information if needed.   No family history on file.    Review of Systems   C: NEGATIVE for fever, chills, change in weight  E/M: NEGATIVE for ear, mouth and throat problems  R: NEGATIVE for significant cough or SOB  CV: NEGATIVE for chest pain, palpitations or peripheral edema    Physical Exam       BP: 134/71     Resp: 16        Vital Signs with Ranges  Resp:  [16] 16  BP: (134)/(71) 134/71  180 lbs 0 oz    Constitutional: awake, alert, cooperative, no apparent distress, and appears stated age  AIRWAY  EXAM: Mallampatti Class I (visualization of the soft palate, fauces, uvula, anterior and posterior pillars)  Respiratory: No increased work of breathing, good air exchange, clear to auscultation bilaterally, no crackles or wheezing  Cardiovascular: Normal apical impulse, regular rate and rhythm, normal S1 and S2, no S3 or S4, and no murmur noted  ASA Class: 2 - Mild systemic disease

## 2022-10-22 NOTE — PLAN OF CARE
Problem: Goal Outcome Summary  Goal: Goal Outcome Summary  PT: Pt orders received, eval completed, treatment initiated.  Pt is a 71 year old female with L TKA POD#0.  Pt resides alone in a multi level home with one step to enter, and stairs to go to the basement, however pt will not need to negotiate stairs down to basement. At baseline, pt reports independence with iADLs, ADLs, and functional mobility without the use of an AD.       Surgeon Discharge Plan: none noted, per pt report, discharge to home     Current Functional Status: Pt transferred sup<>sit at Min A at L LE.  Pt transferred sit<>stand with FWW at CGA.  Pt ambulated 5 ft  with FWW at CGA with KI donned.  L knee ROM 25-55     Barriers to Plan/Home: stairs(have not yet assessed), functional mobility limited by pain              cognitive impairment/gait, locomotion, and balance/muscle strength/poor safety awareness

## (undated) DEVICE — DRAPE STERI U 1015

## (undated) DEVICE — GLOVE PROTEXIS BLUE W/NEU-THERA 7.0  2D73EB70

## (undated) DEVICE — HOOD FLYTE W/PEELAWAY 408-800-100

## (undated) DEVICE — SUCTION IRR SYSTEM W/O TIP INTERPULSE HANDPIECE 0210-100-000

## (undated) DEVICE — WRAP EZY KNEE

## (undated) DEVICE — DRSG ABDOMINAL 07 1/2X8" 7197D

## (undated) DEVICE — SU ETHIBOND 1 CT-1 30" X425H

## (undated) DEVICE — BLADE SAW SAGITTAL STRK 13X90X1.27MM HD SYS 6 6113-127-090

## (undated) DEVICE — SUCTION TIP YANKAUER STR K87

## (undated) DEVICE — WIPES FOLEY CARE SURESTEP PROVON DFC100

## (undated) DEVICE — DRAIN ROUND W/RESERV KIT JACKSON PRATT 10FR 400ML SU130-402D

## (undated) DEVICE — CATH TRAY FOLEY SURESTEP 16FR W/URNE MTR STLK LATEX A303316A

## (undated) DEVICE — BLADE SAW SAGITTAL STRK 18X90X1.37MM HD SYS 6 6118-137-090

## (undated) DEVICE — DRSG ADAPTIC 3X8" 6113

## (undated) DEVICE — Device

## (undated) DEVICE — LINEN TOWEL PACK X5 5464

## (undated) DEVICE — GLOVE PROTEXIS W/NEU-THERA 7.0  2D73TE70

## (undated) DEVICE — PREP CHLORAPREP 26ML TINTED ORANGE  260815

## (undated) DEVICE — DRAPE SHEET REV FOLD 3/4 9349

## (undated) DEVICE — BONE CLEANING TIP INTERPULSE  0210-010-000

## (undated) DEVICE — SOL NACL 0.9% IRRIG 1000ML BOTTLE 07138-09

## (undated) DEVICE — SU VICRYL 2-0 CT-1 27" UND J259H

## (undated) DEVICE — SU VICRYL 0 CT-1 27" J340H

## (undated) DEVICE — DRAPE STERI TOWEL LG 1010

## (undated) DEVICE — IMM KNEE 20" 0814-2660

## (undated) DEVICE — PACK TOTAL KNEE SOP15TKFSD

## (undated) DEVICE — GLOVE PROTEXIS POWDER FREE 7.5 ORTHOPEDIC 2D73ET75

## (undated) DEVICE — BONE CEMENT MIXEVAC III HI VAC KIT  0206-015-000

## (undated) DEVICE — ESU GROUND PAD UNIVERSAL W/O CORD

## (undated) DEVICE — MANIFOLD NEPTUNE 4 PORT 700-20

## (undated) DEVICE — GLOVE PROTEXIS BLUE W/NEU-THERA 8.0  2D73EB80

## (undated) RX ORDER — FENTANYL CITRATE 50 UG/ML
INJECTION, SOLUTION INTRAMUSCULAR; INTRAVENOUS
Status: DISPENSED
Start: 2017-12-07

## (undated) RX ORDER — HYDROMORPHONE HYDROCHLORIDE 1 MG/ML
INJECTION, SOLUTION INTRAMUSCULAR; INTRAVENOUS; SUBCUTANEOUS
Status: DISPENSED
Start: 2017-05-18

## (undated) RX ORDER — CEFAZOLIN SODIUM 2 G/100ML
INJECTION, SOLUTION INTRAVENOUS
Status: DISPENSED
Start: 2017-05-18

## (undated) RX ORDER — FENTANYL CITRATE 50 UG/ML
INJECTION, SOLUTION INTRAMUSCULAR; INTRAVENOUS
Status: DISPENSED
Start: 2017-05-18

## (undated) RX ORDER — CEFAZOLIN SODIUM 1 G/3ML
INJECTION, POWDER, FOR SOLUTION INTRAMUSCULAR; INTRAVENOUS
Status: DISPENSED
Start: 2017-05-18